# Patient Record
Sex: FEMALE | Race: WHITE | NOT HISPANIC OR LATINO | Employment: FULL TIME | URBAN - METROPOLITAN AREA
[De-identification: names, ages, dates, MRNs, and addresses within clinical notes are randomized per-mention and may not be internally consistent; named-entity substitution may affect disease eponyms.]

---

## 2023-10-09 ENCOUNTER — ANCILLARY PROCEDURE (OUTPATIENT)
Dept: RADIOLOGY | Facility: CLINIC | Age: 58
End: 2023-10-09
Payer: COMMERCIAL

## 2023-10-09 DIAGNOSIS — Z12.31 ENCOUNTER FOR SCREENING MAMMOGRAM FOR MALIGNANT NEOPLASM OF BREAST: ICD-10-CM

## 2023-10-09 PROCEDURE — 77063 BREAST TOMOSYNTHESIS BI: CPT | Mod: 50

## 2023-10-09 PROCEDURE — 77067 SCR MAMMO BI INCL CAD: CPT | Mod: BILATERAL PROCEDURE | Performed by: RADIOLOGY

## 2023-10-09 PROCEDURE — 77063 BREAST TOMOSYNTHESIS BI: CPT | Mod: BILATERAL PROCEDURE | Performed by: RADIOLOGY

## 2023-10-18 ENCOUNTER — TELEPHONE (OUTPATIENT)
Dept: OBSTETRICS AND GYNECOLOGY | Facility: CLINIC | Age: 58
End: 2023-10-18
Payer: COMMERCIAL

## 2023-10-18 DIAGNOSIS — R23.2 HOT FLASHES: Primary | ICD-10-CM

## 2023-10-18 NOTE — TELEPHONE ENCOUNTER
Patient states she was to call if she wanted the dosage to be increased; paroxetine 10 mg currently.

## 2023-10-19 RX ORDER — PAROXETINE HYDROCHLORIDE 20 MG/1
20 TABLET, FILM COATED ORAL EVERY MORNING
Qty: 30 TABLET | Refills: 11 | Status: SHIPPED | OUTPATIENT
Start: 2023-10-19 | End: 2023-10-26 | Stop reason: ALTCHOICE

## 2023-10-26 DIAGNOSIS — N95.1 PERIMENOPAUSAL VASOMOTOR SYMPTOMS: ICD-10-CM

## 2023-10-26 RX ORDER — PAROXETINE 10 MG/1
10 TABLET, FILM COATED ORAL DAILY
Qty: 30 TABLET | Refills: 11 | Status: SHIPPED | OUTPATIENT
Start: 2023-10-26 | End: 2024-10-25

## 2023-10-26 RX ORDER — PAROXETINE 10 MG/1
10 TABLET, FILM COATED ORAL DAILY
COMMUNITY
Start: 2023-09-25 | End: 2023-10-26 | Stop reason: ALTCHOICE

## 2023-12-04 ENCOUNTER — OFFICE VISIT (OUTPATIENT)
Dept: OBSTETRICS AND GYNECOLOGY | Facility: CLINIC | Age: 58
End: 2023-12-04
Payer: COMMERCIAL

## 2023-12-04 VITALS — DIASTOLIC BLOOD PRESSURE: 70 MMHG | SYSTOLIC BLOOD PRESSURE: 120 MMHG

## 2023-12-04 DIAGNOSIS — R23.2 HOT FLASH NOT DUE TO MENOPAUSE: Primary | ICD-10-CM

## 2023-12-04 PROCEDURE — 99212 OFFICE O/P EST SF 10 MIN: CPT | Performed by: NURSE PRACTITIONER

## 2023-12-04 PROCEDURE — 1036F TOBACCO NON-USER: CPT | Performed by: NURSE PRACTITIONER

## 2023-12-04 RX ORDER — ESTRADIOL 0.1 MG/G
CREAM VAGINAL 3 TIMES WEEKLY
COMMUNITY
End: 2024-02-09

## 2023-12-04 NOTE — PROGRESS NOTES
Subjective   Patient ID: Nichelle Padilla is a 57 y.o. female who presents for Follow-up (Paroxetine/Reviewing  EMR/Last Annual Exam: 08/07/2023/Negative Pap / Negative HPV 2022/Negative Screening Mammogram  10/05/2022/).    HPIHere for follow-up after starting paroxetine. She feels that her hot flashes are much improved. She feels this has improved her QOL. She is noticing a sleight improvement in mood as well.  She is having some weight gain and some sexual dysfunction since starting this pill, so she has continued on with the 10 mg instead of increasing to the 20mg as previously discussed in a phone note.     Review of Systems   All other systems reviewed and are negative.      Objective   Physical Exam  Constitutional:       Appearance: Normal appearance.   Pulmonary:      Effort: Pulmonary effort is normal.   Neurological:      Mental Status: She is alert.   Psychiatric:         Mood and Affect: Mood normal.         Behavior: Behavior normal.         Assessment/Plan   Diagnoses and all orders for this visit:  Hot flash not due to menopause  Continue paroxetine 10 mg every day.      Bren Bustillo, APRN-CNP

## 2023-12-06 ENCOUNTER — TELEPHONE (OUTPATIENT)
Dept: PRIMARY CARE | Facility: CLINIC | Age: 58
End: 2023-12-06
Payer: COMMERCIAL

## 2023-12-06 NOTE — TELEPHONE ENCOUNTER
Patient called stating her GYN recommended she reach out to you to ask for a low dose CT lung scan due to smoke exposure from her mother growing up. Mother passed of lung cancer.   Also she is requesting an order for a colonoscopy

## 2023-12-06 NOTE — TELEPHONE ENCOUNTER
Per chart review had colonoscopy in 2017 and was told it next needed in 10 years which would be 2027.  She is having concerns and believes she needs 1 earlier she can schedule an appointment and we can discuss  In terms of lung cancer screening test she would not qualify without a personal history of smoking.  Again happy to have her schedule appointment and we can further discuss

## 2023-12-13 ENCOUNTER — TELEMEDICINE (OUTPATIENT)
Dept: PRIMARY CARE | Facility: CLINIC | Age: 58
End: 2023-12-13
Payer: COMMERCIAL

## 2023-12-13 DIAGNOSIS — M25.562 ACUTE PAIN OF LEFT KNEE: Primary | ICD-10-CM

## 2023-12-13 DIAGNOSIS — R19.4 CHANGE IN BOWEL HABIT: ICD-10-CM

## 2023-12-13 PROCEDURE — 99214 OFFICE O/P EST MOD 30 MIN: CPT | Performed by: FAMILY MEDICINE

## 2023-12-13 NOTE — PROGRESS NOTES
Subjective   Patient ID: Nichelle Padilla is a 57 y.o. female who presents for Knee pain wondering about colonoscopy and lung cancer screen.    HPI   Patient would like to make sure that she does not need to have certain screening tests and also discuss concern of left knee pain    She states she had her last colonoscopy in  and believes she was told 5 or 10 years  She called Dr. Mohr who did her previous colonoscopy last year and they said 10 years  For many years she has had alternating loose stools and she just wants to make sure this is not a concern  No blood or mucus in her stool    She also is wondering if she needs a lung cancer screening test  She has never been a smoker but had secondhand exposure as her mother was a smoker and  of lung cancer  She is asymptomatic    C/o Left knee cap lump very tender to kneel on it in the last few weeks.  She noticed it in her exercise classes  No history of similar  Range of motion is okay  Walking is okay  No meds tried    Review of Systems  10 point review of systems negative except what is noted in HPI  Objective   There were no vitals taken for this visit.    Physical Exam  Constitutional:       Appearance: Normal appearance.   Pulmonary:      Effort: Pulmonary effort is normal.   Neurological:      Mental Status: She is alert.   Psychiatric:         Mood and Affect: Mood normal.         Behavior: Behavior normal.         Thought Content: Thought content normal.         Judgment: Judgment normal.         Assessment/Plan   1. Acute pain of left knee  Discussed with patient sounds like prepatellar bursitis that is relatively mild  Discussed supportive care and NSAIDs.  If symptoms worsen she should come into the office for full evaluation    2. Change in bowel habit  She will touch base with Dr. Barrios as she is established with him to determine if colonoscopy needed at this point    We discussed that she does not qualify for lung cancer screening test due to no  personal history of tobacco use.  We did review her CT cardiac score from 2022 that showed some benign nodules and this made her feel better      Gerson Can, DO

## 2024-02-09 DIAGNOSIS — N95.1 VASOMOTOR SYMPTOMS DUE TO MENOPAUSE: ICD-10-CM

## 2024-02-09 RX ORDER — ESTRADIOL 0.1 MG/G
CREAM VAGINAL
Qty: 42.5 G | Refills: 2 | Status: SHIPPED | OUTPATIENT
Start: 2024-02-09

## 2024-02-09 NOTE — TELEPHONE ENCOUNTER
Reviewing  EMR  Last Annual Exam: 08/07/2023  Negative Pap / Negative HPV 2022  Negative Screening Mammogram  10/09/2023  Annual is scheduled 08/12/2024  Medication pended for Bren Bustillo CNP

## 2024-07-01 ENCOUNTER — OFFICE VISIT (OUTPATIENT)
Dept: PRIMARY CARE | Facility: CLINIC | Age: 59
End: 2024-07-01
Payer: COMMERCIAL

## 2024-07-01 VITALS
WEIGHT: 183.2 LBS | DIASTOLIC BLOOD PRESSURE: 80 MMHG | SYSTOLIC BLOOD PRESSURE: 140 MMHG | BODY MASS INDEX: 29.13 KG/M2 | TEMPERATURE: 98 F

## 2024-07-01 DIAGNOSIS — J02.9 PHARYNGITIS, UNSPECIFIED ETIOLOGY: Primary | ICD-10-CM

## 2024-07-01 PROCEDURE — 99213 OFFICE O/P EST LOW 20 MIN: CPT | Performed by: FAMILY MEDICINE

## 2024-07-01 PROCEDURE — 1036F TOBACCO NON-USER: CPT | Performed by: FAMILY MEDICINE

## 2024-07-01 RX ORDER — AZITHROMYCIN 250 MG/1
TABLET, FILM COATED ORAL DAILY
Qty: 6 TABLET | Refills: 0 | Status: SHIPPED | OUTPATIENT
Start: 2024-07-01 | End: 2024-07-03 | Stop reason: ALTCHOICE

## 2024-07-01 ASSESSMENT — PATIENT HEALTH QUESTIONNAIRE - PHQ9
SUM OF ALL RESPONSES TO PHQ9 QUESTIONS 1 AND 2: 0
1. LITTLE INTEREST OR PLEASURE IN DOING THINGS: NOT AT ALL
2. FEELING DOWN, DEPRESSED OR HOPELESS: NOT AT ALL

## 2024-07-01 ASSESSMENT — ENCOUNTER SYMPTOMS
SINUS PRESSURE: 0
DIARRHEA: 0
VOMITING: 0
FATIGUE: 1
PALPITATIONS: 0
UNEXPECTED WEIGHT CHANGE: 0
COUGH: 0
FEVER: 0
NAUSEA: 0
SORE THROAT: 1

## 2024-07-01 NOTE — PROGRESS NOTES
Subjective   Patient ID: Nichelle Padilla is a 58 y.o. female who presents for Sore Throat and Earache (Right ear).    HPI   2 weeks ST- bilat at first but now just right   [place] like things were getting better but then worsened again  Runny nose  Fatigue   Has used prn nsaids and decongestants -helped mildly  Review of Systems   Constitutional:  Positive for fatigue. Negative for fever and unexpected weight change.   HENT:  Positive for ear pain (right) and sore throat. Negative for postnasal drip and sinus pressure.    Respiratory:  Negative for cough.    Cardiovascular:  Negative for chest pain, palpitations and leg swelling.   Gastrointestinal:  Negative for diarrhea, nausea and vomiting.       Objective   /80   Temp 36.7 °C (98 °F)   Wt 83.1 kg (183 lb 3.2 oz)   BMI 29.13 kg/m²     Physical Exam  Vitals and nursing note reviewed.   Constitutional:       Appearance: Normal appearance. She is normal weight.   HENT:      Head: Normocephalic and atraumatic.      Right Ear: Tympanic membrane, ear canal and external ear normal.      Left Ear: Tympanic membrane, ear canal and external ear normal.      Nose: Congestion and rhinorrhea present.      Mouth/Throat:      Lips: Pink.      Mouth: Mucous membranes are moist.      Tongue: No lesions.      Pharynx: Uvula midline. Posterior oropharyngeal erythema (Mild) present. No pharyngeal swelling, oropharyngeal exudate or uvula swelling.      Tonsils: No tonsillar exudate or tonsillar abscesses.   Eyes:      Conjunctiva/sclera: Conjunctivae normal.   Cardiovascular:      Rate and Rhythm: Normal rate and regular rhythm.      Heart sounds: Normal heart sounds.   Pulmonary:      Effort: Pulmonary effort is normal.      Breath sounds: Normal breath sounds.   Musculoskeletal:         General: No swelling.      Cervical back: Normal range of motion and neck supple.   Lymphadenopathy:      Cervical: Cervical adenopathy (Right-sided mild shotty) present.   Skin:     General:  Skin is warm and dry.      Capillary Refill: Capillary refill takes less than 2 seconds.   Neurological:      General: No focal deficit present.      Mental Status: She is alert. Mental status is at baseline.   Psychiatric:         Mood and Affect: Mood normal.         Behavior: Behavior normal.         Thought Content: Thought content normal.         Judgment: Judgment normal.         Assessment/Plan   Discussed with patient may still be viral.  Supportive care if no better in the next 48 hours antibiotic given  Gerson Can, DO

## 2024-07-03 ENCOUNTER — APPOINTMENT (OUTPATIENT)
Dept: DERMATOLOGY | Facility: CLINIC | Age: 59
End: 2024-07-03
Payer: COMMERCIAL

## 2024-07-03 DIAGNOSIS — L82.1 SEBORRHEIC KERATOSIS: ICD-10-CM

## 2024-07-03 DIAGNOSIS — D18.01 HEMANGIOMA OF SKIN: ICD-10-CM

## 2024-07-03 DIAGNOSIS — D22.9 MELANOCYTIC NEVUS, UNSPECIFIED LOCATION: ICD-10-CM

## 2024-07-03 DIAGNOSIS — Z12.83 ENCOUNTER FOR SCREENING FOR MALIGNANT NEOPLASM OF SKIN: Primary | ICD-10-CM

## 2024-07-03 DIAGNOSIS — L81.4 LENTIGO: ICD-10-CM

## 2024-07-03 PROCEDURE — 99213 OFFICE O/P EST LOW 20 MIN: CPT | Performed by: NURSE PRACTITIONER

## 2024-07-03 PROCEDURE — 1036F TOBACCO NON-USER: CPT | Performed by: NURSE PRACTITIONER

## 2024-07-03 ASSESSMENT — DERMATOLOGY QUALITY OF LIFE (QOL) ASSESSMENT
DATE THE QUALITY-OF-LIFE ASSESSMENT WAS COMPLETED: 60631
RATE HOW BOTHERED YOU ARE BY SYMPTOMS OF YOUR SKIN PROBLEM (EG, ITCHING, STINGING BURNING, HURTING OR SKIN IRRITATION): 1
WHAT SINGLE SKIN CONDITION LISTED BELOW IS THE PATIENT ANSWERING THE QUALITY-OF-LIFE ASSESSMENT QUESTIONS ABOUT: NONE OF THE ABOVE
WHAT SINGLE SKIN CONDITION LISTED BELOW IS THE PATIENT ANSWERING THE QUALITY-OF-LIFE ASSESSMENT QUESTIONS ABOUT: NONE OF THE ABOVE
RATE HOW EMOTIONALLY BOTHERED YOU ARE BY YOUR SKIN PROBLEM (FOR EXAMPLE, WORRY, EMBARRASSMENT, FRUSTRATION): 0 - NEVER BOTHERED
RATE HOW EMOTIONALLY BOTHERED YOU ARE BY YOUR SKIN PROBLEM (FOR EXAMPLE, WORRY, EMBARRASSMENT, FRUSTRATION): 0 - NEVER BOTHERED
RATE HOW BOTHERED YOU ARE BY EFFECTS OF YOUR SKIN PROBLEMS ON YOUR ACTIVITIES (EG, GOING OUT, ACCOMPLISHING WHAT YOU WANT, WORK ACTIVITIES OR YOUR RELATIONSHIPS WITH OTHERS): 1
RATE HOW BOTHERED YOU ARE BY EFFECTS OF YOUR SKIN PROBLEMS ON YOUR ACTIVITIES (EG, GOING OUT, ACCOMPLISHING WHAT YOU WANT, WORK ACTIVITIES OR YOUR RELATIONSHIPS WITH OTHERS): 1
RATE HOW BOTHERED YOU ARE BY SYMPTOMS OF YOUR SKIN PROBLEM (EG, ITCHING, STINGING BURNING, HURTING OR SKIN IRRITATION): 1

## 2024-07-03 NOTE — PROGRESS NOTES
Subjective   Nichelle Padilla is a 58 y.o. female who presents for the following: Skin Check.  Skin Cancer Screening  She has a history of moderate sun exposure. She is in the sun continuously. She uses sunscreen occasionally. She reports no skin symptoms. Her moles are not changing.    Spots that concern her:         Objective   Well appearing patient in no apparent distress; mood and affect are within normal limits.    A full examination was performed including scalp, head, eyes, ears, nose, lips, neck, chest, axillae, abdomen, back, buttocks, bilateral upper extremities, bilateral lower extremities, hands, feet, fingers, toes, fingernails, and toenails. All findings within normal limits unless otherwise noted below.    Scattered benign lesions    Uniform pigmented macule(s)/papule(s) with reassuring findings on dermoscopy    Stuck on verrucous, tan-brown papules and plaques.      Violaceous/red papule with maroon lagoons     Scattered tan macules in sun-exposed areas.      Assessment/Plan   Encounter for screening for malignant neoplasm of skin    - Protective measures, such as avoiding skin exposure to sunlight during peak sun hours (10 AM to 3 PM), wearing protective clothing, and applying high-SPF sunscreen, are essential for reducing exposure to harmful ultraviolet (UV) light.  - Monthly self-examination of the skin is helpful to detect new lesions or changes in existing lesions.  - Discussed signs and symptoms of sun-related skin cancers.   - Make sure your moles are not signs of skin cancer (melanoma). Remember the ABCDEs of melanoma lesions:  A - Asymmetry: One half of the lesion does not mirror the other half.  B - Border: The borders are irregular or vague (indistinct).  C - Color: More than one color may be noted within the mole.  D - Diameter: Size greater than 6 mm (roughly the size of a pencil eraser) may be concerning.  E - Evolving: Notable changes in the lesion over time are suspicious signs for  skin cancer.    Melanocytic nevus, unspecified location    -Discussed nature of condition  -Reassurance, benign-appearing features on examination today  -Recommend continued observation    Seborrheic keratosis    Although Seborrheic Keratoses can be troublesome and unsightly, they are entirely benign.  Removal of Seborrheic Keratoses is considered a cosmetic procedure. Removal is typically performed using liquid nitrogen cryotherapy.  Treatment of current lesions does not prevent the development of new Seborrheic Keratoses in the future.    Hemangioma of skin    - A cherry hemangioma is a small macule (small, flat, smooth area) or papule (small, solid bump) formed from an overgrowth of tiny blood vessels in the skin. Cherry hemangiomas are characteristically red or purplish in color. They often first appear in middle adulthood and usually increase in number with age. Cherry hemangiomas are noncancerous (benign) and are common in adults.  - Lesions are benign, reassured patient.     Lentigo    A solar lentigo (plural, solar lentigines), sometimes called an age spot or liver spot, is a brown macule (small, flat, smooth area of skin) caused by chronic sun or artificial ultraviolet (UV) light exposure. There may be just one lentigo or there may be multiple. This type of lentigo is different from lentigo simplex (discussed separately) because it is caused by exposure to UV light. Solar lentigines are benign, but they do indicate excessive sun exposure, a risk factor for the development of skin cancer.  Lesions are benign, no treatment needed.     Follow up in 12 months for a total body skin exam. Please call me if there are any changes or development of concerning symptoms (lesion/skin condition is changing, bleeding, enlarging, or worsening).

## 2024-08-12 ENCOUNTER — APPOINTMENT (OUTPATIENT)
Dept: OBSTETRICS AND GYNECOLOGY | Facility: CLINIC | Age: 59
End: 2024-08-12
Payer: COMMERCIAL

## 2024-10-07 ENCOUNTER — LAB (OUTPATIENT)
Dept: LAB | Facility: LAB | Age: 59
End: 2024-10-07
Payer: COMMERCIAL

## 2024-10-07 ENCOUNTER — APPOINTMENT (OUTPATIENT)
Dept: PRIMARY CARE | Facility: CLINIC | Age: 59
End: 2024-10-07
Payer: COMMERCIAL

## 2024-10-07 VITALS
OXYGEN SATURATION: 98 % | TEMPERATURE: 97.7 F | BODY MASS INDEX: 28.82 KG/M2 | DIASTOLIC BLOOD PRESSURE: 90 MMHG | SYSTOLIC BLOOD PRESSURE: 140 MMHG | WEIGHT: 183.6 LBS | HEIGHT: 67 IN | HEART RATE: 73 BPM

## 2024-10-07 DIAGNOSIS — R00.2 PALPITATIONS: ICD-10-CM

## 2024-10-07 DIAGNOSIS — Z00.00 ROUTINE GENERAL MEDICAL EXAMINATION AT A HEALTH CARE FACILITY: Primary | ICD-10-CM

## 2024-10-07 DIAGNOSIS — M25.511 CHRONIC PAIN OF BOTH SHOULDERS: ICD-10-CM

## 2024-10-07 DIAGNOSIS — R73.01 ELEVATED FASTING GLUCOSE: ICD-10-CM

## 2024-10-07 DIAGNOSIS — R20.2 PARESTHESIA AND PAIN OF BOTH UPPER EXTREMITIES: ICD-10-CM

## 2024-10-07 DIAGNOSIS — M79.602 PARESTHESIA AND PAIN OF BOTH UPPER EXTREMITIES: ICD-10-CM

## 2024-10-07 DIAGNOSIS — Z00.00 ROUTINE GENERAL MEDICAL EXAMINATION AT A HEALTH CARE FACILITY: ICD-10-CM

## 2024-10-07 DIAGNOSIS — M79.601 PARESTHESIA AND PAIN OF BOTH UPPER EXTREMITIES: ICD-10-CM

## 2024-10-07 DIAGNOSIS — M25.512 CHRONIC PAIN OF BOTH SHOULDERS: ICD-10-CM

## 2024-10-07 DIAGNOSIS — G89.29 CHRONIC PAIN OF BOTH SHOULDERS: ICD-10-CM

## 2024-10-07 PROBLEM — M25.519 SHOULDER PAIN: Status: ACTIVE | Noted: 2024-10-07

## 2024-10-07 PROBLEM — E78.5 HYPERLIPEMIA: Status: ACTIVE | Noted: 2024-10-07

## 2024-10-07 PROBLEM — I82.409 ACUTE EMBOLISM AND THROMBOSIS OF UNSPECIFIED DEEP VEINS OF UNSPECIFIED LOWER EXTREMITY (MULTI): Status: ACTIVE | Noted: 2024-10-07

## 2024-10-07 LAB
ALBUMIN SERPL BCP-MCNC: 4.2 G/DL (ref 3.4–5)
ALP SERPL-CCNC: 70 U/L (ref 33–110)
ALT SERPL W P-5'-P-CCNC: 15 U/L (ref 7–45)
ANION GAP SERPL CALC-SCNC: 11 MMOL/L (ref 10–20)
AST SERPL W P-5'-P-CCNC: 18 U/L (ref 9–39)
BASOPHILS # BLD AUTO: 0.05 X10*3/UL (ref 0–0.1)
BASOPHILS NFR BLD AUTO: 0.8 %
BILIRUB SERPL-MCNC: 1 MG/DL (ref 0–1.2)
BUN SERPL-MCNC: 12 MG/DL (ref 6–23)
CALCIUM SERPL-MCNC: 9 MG/DL (ref 8.6–10.3)
CHLORIDE SERPL-SCNC: 104 MMOL/L (ref 98–107)
CHOLEST SERPL-MCNC: 259 MG/DL (ref 0–199)
CHOLESTEROL/HDL RATIO: 3.8
CO2 SERPL-SCNC: 28 MMOL/L (ref 21–32)
CREAT SERPL-MCNC: 0.8 MG/DL (ref 0.5–1.05)
EGFRCR SERPLBLD CKD-EPI 2021: 86 ML/MIN/1.73M*2
EOSINOPHIL # BLD AUTO: 0.23 X10*3/UL (ref 0–0.7)
EOSINOPHIL NFR BLD AUTO: 3.5 %
ERYTHROCYTE [DISTWIDTH] IN BLOOD BY AUTOMATED COUNT: 12.9 % (ref 11.5–14.5)
GLUCOSE SERPL-MCNC: 117 MG/DL (ref 74–99)
HCT VFR BLD AUTO: 44 % (ref 36–46)
HDLC SERPL-MCNC: 67.8 MG/DL
HGB BLD-MCNC: 14.5 G/DL (ref 12–16)
IMM GRANULOCYTES # BLD AUTO: 0.01 X10*3/UL (ref 0–0.7)
IMM GRANULOCYTES NFR BLD AUTO: 0.2 % (ref 0–0.9)
LDLC SERPL CALC-MCNC: 173 MG/DL
LYMPHOCYTES # BLD AUTO: 1.34 X10*3/UL (ref 1.2–4.8)
LYMPHOCYTES NFR BLD AUTO: 20.2 %
MCH RBC QN AUTO: 29.7 PG (ref 26–34)
MCHC RBC AUTO-ENTMCNC: 33 G/DL (ref 32–36)
MCV RBC AUTO: 90 FL (ref 80–100)
MONOCYTES # BLD AUTO: 0.76 X10*3/UL (ref 0.1–1)
MONOCYTES NFR BLD AUTO: 11.4 %
NEUTROPHILS # BLD AUTO: 4.25 X10*3/UL (ref 1.2–7.7)
NEUTROPHILS NFR BLD AUTO: 63.9 %
NON HDL CHOLESTEROL: 191 MG/DL (ref 0–149)
NRBC BLD-RTO: 0 /100 WBCS (ref 0–0)
PLATELET # BLD AUTO: 216 X10*3/UL (ref 150–450)
POTASSIUM SERPL-SCNC: 4.1 MMOL/L (ref 3.5–5.3)
PROT SERPL-MCNC: 6.7 G/DL (ref 6.4–8.2)
RBC # BLD AUTO: 4.89 X10*6/UL (ref 4–5.2)
SODIUM SERPL-SCNC: 139 MMOL/L (ref 136–145)
TRIGL SERPL-MCNC: 89 MG/DL (ref 0–149)
TSH SERPL-ACNC: 1.72 MIU/L (ref 0.44–3.98)
VLDL: 18 MG/DL (ref 0–40)
WBC # BLD AUTO: 6.6 X10*3/UL (ref 4.4–11.3)

## 2024-10-07 PROCEDURE — 85025 COMPLETE CBC W/AUTO DIFF WBC: CPT

## 2024-10-07 PROCEDURE — 99396 PREV VISIT EST AGE 40-64: CPT | Performed by: FAMILY MEDICINE

## 2024-10-07 PROCEDURE — 1036F TOBACCO NON-USER: CPT | Performed by: FAMILY MEDICINE

## 2024-10-07 PROCEDURE — 83036 HEMOGLOBIN GLYCOSYLATED A1C: CPT

## 2024-10-07 PROCEDURE — 36415 COLL VENOUS BLD VENIPUNCTURE: CPT

## 2024-10-07 PROCEDURE — 80053 COMPREHEN METABOLIC PANEL: CPT

## 2024-10-07 PROCEDURE — 80061 LIPID PANEL: CPT

## 2024-10-07 PROCEDURE — 99214 OFFICE O/P EST MOD 30 MIN: CPT | Performed by: FAMILY MEDICINE

## 2024-10-07 PROCEDURE — 3008F BODY MASS INDEX DOCD: CPT | Performed by: FAMILY MEDICINE

## 2024-10-07 PROCEDURE — 84443 ASSAY THYROID STIM HORMONE: CPT

## 2024-10-07 RX ORDER — MELOXICAM 15 MG/1
15 TABLET ORAL DAILY
Qty: 30 TABLET | Refills: 1 | Status: SHIPPED | OUTPATIENT
Start: 2024-10-07 | End: 2025-10-07

## 2024-10-07 ASSESSMENT — ENCOUNTER SYMPTOMS
DYSURIA: 0
HEADACHES: 0
WOUND: 0
APPETITE CHANGE: 0
SHORTNESS OF BREATH: 0
VOMITING: 0
SINUS PRESSURE: 0
COUGH: 0
NAUSEA: 0
ABDOMINAL PAIN: 0
CONSTIPATION: 0
NECK PAIN: 1
NUMBNESS: 1
NERVOUS/ANXIOUS: 0
LIGHT-HEADEDNESS: 0
DYSPHORIC MOOD: 0
BLOOD IN STOOL: 0
DIARRHEA: 0
DIZZINESS: 0
FATIGUE: 0
JOINT SWELLING: 0
ACTIVITY CHANGE: 0
SLEEP DISTURBANCE: 0
ARTHRALGIAS: 0
EYE PAIN: 0
PALPITATIONS: 0

## 2024-10-07 ASSESSMENT — PATIENT HEALTH QUESTIONNAIRE - PHQ9
2. FEELING DOWN, DEPRESSED OR HOPELESS: NOT AT ALL
1. LITTLE INTEREST OR PLEASURE IN DOING THINGS: NOT AT ALL
SUM OF ALL RESPONSES TO PHQ9 QUESTIONS 1 AND 2: 0

## 2024-10-07 NOTE — PROGRESS NOTES
Subjective   Patient ID: Nichelle Padilla is a 58 y.o. female who presents for Annual Exam + concerns- Pt agrees to both    HPI   Derm- Susan Mayne  GYN- Bren Bustillo-appointment next week-Pap and mammogram up-to-date-mammogram due soon  GI- Dr Burrell -colonscopy done 11/17/17- normal- next due 2027    Shoulder issues b/l pain- has done PT many years ago   Gets some numbness down her arms   Get massage and told thought thoracic outlet syndrome so went to see vascular which was ruled out by CXR and US UE. Told likely neurologic and to get that checked out instead   Still with numbness and pain in right hand and thumb more so recently   Had XR cervical xrays in 2016 for similar sx   Wears a brace at night which helped previously but not so much now   She is right handed    She also notes she occasionally has palpitations.  She states they occur for a couple seconds at most weekly.  There is no associated pain or shortness of breath    She does have some frustration with difficult weight loss  Exercise 3 days a week -next of cycling hiking walking etc.  Enjoys sweets   Post menopausal     Is considering shingles vaccine but declines flu vaccine    Review of Systems   Constitutional:  Negative for activity change, appetite change and fatigue.   HENT:  Negative for congestion, postnasal drip and sinus pressure.    Eyes:  Negative for pain and visual disturbance.   Respiratory:  Negative for cough and shortness of breath.    Cardiovascular:  Negative for chest pain, palpitations and leg swelling.   Gastrointestinal:  Negative for abdominal pain, blood in stool, constipation, diarrhea, nausea and vomiting.   Endocrine: Negative for cold intolerance and heat intolerance.   Genitourinary:  Negative for dysuria and menstrual problem.   Musculoskeletal:  Positive for neck pain. Negative for arthralgias and joint swelling.   Skin:  Negative for rash and wound.   Neurological:  Positive for numbness (hands R>L). Negative for  "dizziness, light-headedness and headaches.   Psychiatric/Behavioral:  Negative for dysphoric mood and sleep disturbance. The patient is not nervous/anxious.        Objective   /90   Pulse 73   Temp 36.5 °C (97.7 °F)   Ht 1.689 m (5' 6.5\")   Wt 83.3 kg (183 lb 9.6 oz)   SpO2 98%   BMI 29.19 kg/m²     Physical Exam  Vitals and nursing note reviewed.   Constitutional:       Appearance: Normal appearance.   HENT:      Head: Normocephalic and atraumatic.      Right Ear: Tympanic membrane, ear canal and external ear normal.      Left Ear: Tympanic membrane, ear canal and external ear normal.      Nose: Nose normal.      Mouth/Throat:      Mouth: Mucous membranes are moist.      Pharynx: Oropharynx is clear.   Eyes:      Extraocular Movements: Extraocular movements intact.      Conjunctiva/sclera: Conjunctivae normal.      Pupils: Pupils are equal, round, and reactive to light.   Neck:      Comments: Spurling test negative bilaterally  Cardiovascular:      Rate and Rhythm: Normal rate and regular rhythm.      Pulses: Normal pulses.      Heart sounds: Normal heart sounds. No murmur heard.     No friction rub. No gallop.   Pulmonary:      Effort: Pulmonary effort is normal. No respiratory distress.      Breath sounds: Normal breath sounds.   Abdominal:      General: Abdomen is flat. Bowel sounds are normal.      Palpations: Abdomen is soft. There is no mass.      Tenderness: There is no abdominal tenderness. There is no rebound.   Musculoskeletal:         General: No swelling or deformity. Normal range of motion.      Right shoulder: Normal. No tenderness or bony tenderness. Normal range of motion.      Left shoulder: No tenderness or bony tenderness. Normal range of motion.      Right wrist: Normal.      Left wrist: Normal.      Right hand: Normal.      Left hand: Normal.      Cervical back: Normal, normal range of motion and neck supple. No rigidity, spasms, torticollis, tenderness or bony tenderness. No pain " with movement, spinous process tenderness or muscular tenderness. Normal range of motion.      Comments: Tinel's and Finkelstein's negative   Lymphadenopathy:      Cervical: No cervical adenopathy.   Skin:     General: Skin is warm and dry.      Capillary Refill: Capillary refill takes less than 2 seconds.      Findings: No rash.   Neurological:      General: No focal deficit present.      Mental Status: She is alert and oriented to person, place, and time. Mental status is at baseline.      Cranial Nerves: No cranial nerve deficit.      Sensory: Sensation is intact.      Motor: Motor function is intact. No weakness, tremor, atrophy, abnormal muscle tone or pronator drift.      Coordination: Coordination is intact.      Gait: Gait normal.      Deep Tendon Reflexes: Reflexes normal.      Reflex Scores:       Tricep reflexes are 2+ on the right side and 2+ on the left side.       Bicep reflexes are 2+ on the right side and 2+ on the left side.       Brachioradialis reflexes are 2+ on the right side and 1+ on the left side.  Psychiatric:         Mood and Affect: Mood normal.         Behavior: Behavior normal.         Thought Content: Thought content normal.         Judgment: Judgment normal.         Assessment/Plan   1. Routine general medical examination at a health care facility  Age-appropriate anticipatory guidance given  Consider shingles and flu vaccines  Mammogram and Pap by GYN  Colonoscopy due 2027  - CBC and Auto Differential; Future  - Lipid Panel; Future  - Comprehensive Metabolic Panel; Future  - TSH with reflex to Free T4 if abnormal; Future    2. Paresthesia and pain of both upper extremities  3. Chronic pain of both shoulders  Discussed with patient may be multiple factors at play-we will get EMG/NCT to discuss next steps moving forward  She states she did do physical therapy in the past with traction which was helpful for her shoulder pain but she was not having the paresthesias in her hands at the  same time  Likely component of carpal tunnel as well  She is already doing bracing  Trial of meloxicam in the meantime.  Follow-up to be determined  - EMG & nerve conduction; Future  - meloxicam (Mobic) 15 mg tablet; Take 1 tablet (15 mg) by mouth once daily.  Dispense: 30 tablet; Refill: 1        4. Palpitations  Suggest likely benign palpitations.  We will get TSH.  Can consider further workup based on patient's home monitoring  Mildly elevated BP. Cont to monitor        Gerson Can, DO

## 2024-10-07 NOTE — PATIENT INSTRUCTIONS
Take mobic daily for 7-10 days then use as needed if helping     Get emg/NCT done  done     Recommendations for women annual wellness exam:  Make sure screenings for cervical, breast, and colon cancer are up to date if applicable- pap smears age 21-65, discuss mammogram starting at age 40, colonoscopy at age 45, earlier if positive family history for breast or colon cancer  Screen for osteoporosis with DXA bone scan starting at age 65 or sooner if risk factors present (long term steroid use, smoking, heavy alcohol use, history of fracture, rheumatoid arthritis, low body weight, family history of hip fracture)  Screening for lung cancer with low-dose CT in all adults age 55-77 who have a 30 pack-year smoking history and currently smoke or have quit within the past 15 years  Follow a healthy diet (Dash diet, Mediterranean diet)  Exercise 150 min/wk  Maintain healthy weight (BMI < 25)  Do not smoke  Alcohol in moderation (up to 1 drink/day)  Get enough sleep (7-8 hours/night)  Make sure immunizations are up to date (influenza, pneumococcal, Tdap, shingles if age > 50)  Postmenopausal women or women with osteoporosis need minimum 1,200 mg calcium and 800 IU vitamin D daily  Talk to your physician if you have concerns about depression or anxiety  Visit dentist twice yearly

## 2024-10-08 ENCOUNTER — TELEPHONE (OUTPATIENT)
Dept: PRIMARY CARE | Facility: CLINIC | Age: 59
End: 2024-10-08
Payer: COMMERCIAL

## 2024-10-08 NOTE — TELEPHONE ENCOUNTER
Patient was prescribed meloxicam yesterday. She's been coughing and having chest congestion and wanted to know if she could take an otc cough medication or tylenol while taking the meloxicam?

## 2024-10-09 DIAGNOSIS — Z00.00 ROUTINE GENERAL MEDICAL EXAMINATION AT A HEALTH CARE FACILITY: ICD-10-CM

## 2024-10-09 DIAGNOSIS — R73.01 ELEVATED FASTING GLUCOSE: Primary | ICD-10-CM

## 2024-10-09 LAB
EST. AVERAGE GLUCOSE BLD GHB EST-MCNC: 117 MG/DL
HBA1C MFR BLD: 5.7 %

## 2024-10-10 ENCOUNTER — E-VISIT (OUTPATIENT)
Dept: PRIMARY CARE | Facility: CLINIC | Age: 59
End: 2024-10-10
Payer: COMMERCIAL

## 2024-10-10 DIAGNOSIS — E78.2 MIXED HYPERLIPIDEMIA: Primary | ICD-10-CM

## 2024-10-10 DIAGNOSIS — R73.03 PREDIABETES: ICD-10-CM

## 2024-10-11 ENCOUNTER — TELEPHONE (OUTPATIENT)
Dept: PRIMARY CARE | Facility: CLINIC | Age: 59
End: 2024-10-11
Payer: COMMERCIAL

## 2024-10-11 DIAGNOSIS — R73.03 PREDIABETES: ICD-10-CM

## 2024-10-11 DIAGNOSIS — E78.5 HYPERLIPIDEMIA, UNSPECIFIED HYPERLIPIDEMIA TYPE: ICD-10-CM

## 2024-10-11 NOTE — TELEPHONE ENCOUNTER
Per roberth smith to place order for nutritionist.   Will call patient to give her info and schedule 6 month follow up

## 2024-10-14 ENCOUNTER — APPOINTMENT (OUTPATIENT)
Dept: OBSTETRICS AND GYNECOLOGY | Facility: CLINIC | Age: 59
End: 2024-10-14
Payer: COMMERCIAL

## 2024-10-14 VITALS — SYSTOLIC BLOOD PRESSURE: 110 MMHG | DIASTOLIC BLOOD PRESSURE: 70 MMHG

## 2024-10-14 DIAGNOSIS — Z01.419 ENCOUNTER FOR WELL WOMAN EXAM WITH ROUTINE GYNECOLOGICAL EXAM: Primary | ICD-10-CM

## 2024-10-14 DIAGNOSIS — N95.1 VASOMOTOR SYMPTOMS DUE TO MENOPAUSE: ICD-10-CM

## 2024-10-14 PROCEDURE — 99396 PREV VISIT EST AGE 40-64: CPT | Performed by: NURSE PRACTITIONER

## 2024-10-14 PROCEDURE — 1036F TOBACCO NON-USER: CPT | Performed by: NURSE PRACTITIONER

## 2024-10-14 RX ORDER — ESTRADIOL 0.1 MG/G
CREAM VAGINAL
Qty: 42.5 G | Refills: 3 | Status: SHIPPED | OUTPATIENT
Start: 2024-10-14

## 2024-10-14 NOTE — PROGRESS NOTES
HPI:   Nichelle Padilla is a 58 y.o. who presents today for her annual gynecologic exam without complaints    She has the following concerns; None. No GYN concerns she is doing well. Needs refills of her vaginal estrace cream. No longer taking paroxetine. Doing well without this medication. Does not want to restart this.    GYN HISTORY:  Denies any PMB.     PAP History   Last pap:   2022 Normal HPV Negative  History of abnormal pap: no  HPV vaccine: no  @paphx@    Health Screening  Family history of breast, uterine, ovarian or colon cancer: maternal aunt and maternal grandmother has a hx of breast cancer.    Breast cancer: mammogram - needs an order.   Last mammogram: 2023    Colon cancer: due in 2027.       The patient feels safe at home.         Review of Systems:   Constitutional: no fever and no chills.  Cardiovascular: no chest pain.   Respiratory: no shortness of breath.   Gastrointestinal: no nausea, no abdominal pain and no constipation  Genitourinary: no dysuria, no urinary incontinence, no vaginal dryness, no pelvic pain and no vaginal discharge.   Neurological: no headache.  Psychiatric: no anxiety and no depression.              Objective         /70         Physical Exam:   Constitutional: Alert and in no acute distress. Well developed, well nourished.      Neck: No neck asymmetry. Supple. Thyroid not enlarged and there were no palpable thyroid nodules.      Cardiovascular: Heart rate and rhythm were normal, normal S1 and S2, no gallops, and no murmurs.      Pulmonary: No respiratory distress. Clear bilateral breath sounds.      Chest: Breasts: Normal appearance, no nipple discharge and no skin changes. Palpation of breasts and axillae: No palpable mass and no axillary lymphadenopathy.      Abdomen: Soft nontender; no abdominal mass palpated. Normal bowel sounds. No organomegaly.      Genitourinary:   - External genitalia: Normal.   - Palpation of lymph nodes in groin: No inguinal  lymphadenopathy.   - Bartholin's Urethral and Skenes Glands: Normal.   - Urethra: Normal.    -Bladder: Normal on palpation.   - Vagina: Normal.   - Cervix: Normal.   - Uterus: Normal. Right Adnexa/parametria: Normal. Left Adnexa/parametria: Normal.   - Perianal Area: Normal.      Skin: Normal skin color and pigmentation, normal skin turgor, and no rash     Psychiatric: Alert and oriented x 3. Affect normal to patient baseline. Mood: Appropriate.            Assessment/Plan       Diagnoses and all orders for this visit:  Encounter for well woman exam with routine gynecological exam  Here for well woman exam. She is doing well. PAP is up to date. Mammogram is scheduled. Vaginal estrace cream refilled.   Vasomotor symptoms due to menopause  -     estradiol (Estrace) 0.01 % (0.1 mg/gram) vaginal cream; Insert one gram into the vagina twice weekly.  Follow-up annually; sooner if needed.       Bren Bustillo, APRN-CNP

## 2024-10-23 ENCOUNTER — HOSPITAL ENCOUNTER (OUTPATIENT)
Dept: RADIOLOGY | Facility: CLINIC | Age: 59
Discharge: HOME | End: 2024-10-23
Payer: COMMERCIAL

## 2024-10-23 VITALS — BODY MASS INDEX: 28.45 KG/M2 | WEIGHT: 177 LBS | HEIGHT: 66 IN

## 2024-10-23 DIAGNOSIS — Z00.00 ROUTINE GENERAL MEDICAL EXAMINATION AT A HEALTH CARE FACILITY: ICD-10-CM

## 2024-10-23 PROCEDURE — 77067 SCR MAMMO BI INCL CAD: CPT

## 2024-10-23 PROCEDURE — 77067 SCR MAMMO BI INCL CAD: CPT | Performed by: RADIOLOGY

## 2024-10-23 PROCEDURE — 77063 BREAST TOMOSYNTHESIS BI: CPT | Performed by: RADIOLOGY

## 2024-11-06 ENCOUNTER — TELEPHONE (OUTPATIENT)
Dept: SCHEDULING | Age: 59
End: 2024-11-06
Payer: COMMERCIAL

## 2024-11-06 NOTE — TELEPHONE ENCOUNTER
I spoke with the pt regarding the scheduling of EMG testing for her arm pain.  She has left [facility] [place], [place] with the dept that handles this testing. I also spoke to her about a nutritionist. She said she will call back, but I also told her I will find out if there is someone that handles menopausal nutrition, and return her call.

## 2024-11-21 ENCOUNTER — TELEPHONE (OUTPATIENT)
Dept: PRIMARY CARE | Facility: CLINIC | Age: 59
End: 2024-11-21
Payer: COMMERCIAL

## 2024-11-21 DIAGNOSIS — G89.29 CHRONIC PAIN OF BOTH SHOULDERS: ICD-10-CM

## 2024-11-21 DIAGNOSIS — R20.2 PARESTHESIA AND PAIN OF BOTH UPPER EXTREMITIES: ICD-10-CM

## 2024-11-21 DIAGNOSIS — M79.602 PARESTHESIA AND PAIN OF BOTH UPPER EXTREMITIES: ICD-10-CM

## 2024-11-21 DIAGNOSIS — M25.511 CHRONIC PAIN OF BOTH SHOULDERS: ICD-10-CM

## 2024-11-21 DIAGNOSIS — M79.601 PARESTHESIA AND PAIN OF BOTH UPPER EXTREMITIES: ICD-10-CM

## 2024-11-21 DIAGNOSIS — M25.512 CHRONIC PAIN OF BOTH SHOULDERS: ICD-10-CM

## 2024-11-21 RX ORDER — MELOXICAM 15 MG/1
15 TABLET ORAL DAILY
Qty: 30 TABLET | Refills: 1 | Status: SHIPPED | OUTPATIENT
Start: 2024-11-21 | End: 2025-11-21

## 2024-11-26 ENCOUNTER — HOSPITAL ENCOUNTER (OUTPATIENT)
Dept: NEUROLOGY | Facility: CLINIC | Age: 59
Discharge: HOME | End: 2024-11-26
Payer: COMMERCIAL

## 2024-11-26 DIAGNOSIS — M79.601 PARESTHESIA AND PAIN OF BOTH UPPER EXTREMITIES: ICD-10-CM

## 2024-11-26 DIAGNOSIS — M25.511 CHRONIC PAIN OF BOTH SHOULDERS: ICD-10-CM

## 2024-11-26 DIAGNOSIS — M25.512 CHRONIC PAIN OF BOTH SHOULDERS: ICD-10-CM

## 2024-11-26 DIAGNOSIS — R20.2 PARESTHESIA AND PAIN OF BOTH UPPER EXTREMITIES: ICD-10-CM

## 2024-11-26 DIAGNOSIS — G89.29 CHRONIC PAIN OF BOTH SHOULDERS: ICD-10-CM

## 2024-11-26 DIAGNOSIS — M79.602 PARESTHESIA AND PAIN OF BOTH UPPER EXTREMITIES: ICD-10-CM

## 2024-11-26 PROCEDURE — 95886 MUSC TEST DONE W/N TEST COMP: CPT | Performed by: STUDENT IN AN ORGANIZED HEALTH CARE EDUCATION/TRAINING PROGRAM

## 2024-11-26 PROCEDURE — 95912 NRV CNDJ TEST 11-12 STUDIES: CPT | Performed by: STUDENT IN AN ORGANIZED HEALTH CARE EDUCATION/TRAINING PROGRAM

## 2024-11-26 PROCEDURE — 95885 MUSC TST DONE W/NERV TST LIM: CPT | Performed by: STUDENT IN AN ORGANIZED HEALTH CARE EDUCATION/TRAINING PROGRAM

## 2024-12-02 ENCOUNTER — TELEPHONE (OUTPATIENT)
Dept: PRIMARY CARE | Facility: CLINIC | Age: 59
End: 2024-12-02
Payer: COMMERCIAL

## 2024-12-02 DIAGNOSIS — G56.03 BILATERAL CARPAL TUNNEL SYNDROME: Primary | ICD-10-CM

## 2024-12-02 NOTE — TELEPHONE ENCOUNTER
----- Message from Gerson Can sent at 12/2/2024  9:30 AM EST -----  Please let patient know EMG nerve conduction test findings consistent with moderate to severe carpal tunnel syndrome.  I would suggest referral to orthopedics to talk about next steps and intervention including injections and/or surgery.  I suggest Dr. José Manuel Chacko with  and have placed a orthopedic hand referral

## 2024-12-02 NOTE — TELEPHONE ENCOUNTER
Okay per HIPAA to leave a detailed message.  Left voicemail relaying message from provider. Also left Fara's number to help with scheduling

## 2024-12-19 ENCOUNTER — APPOINTMENT (OUTPATIENT)
Dept: PRIMARY CARE | Facility: CLINIC | Age: 59
End: 2024-12-19
Payer: COMMERCIAL

## 2025-01-28 ENCOUNTER — OFFICE VISIT (OUTPATIENT)
Dept: ORTHOPEDIC SURGERY | Facility: CLINIC | Age: 60
End: 2025-01-28
Payer: COMMERCIAL

## 2025-01-28 DIAGNOSIS — G56.03 BILATERAL CARPAL TUNNEL SYNDROME: ICD-10-CM

## 2025-01-28 PROCEDURE — 99204 OFFICE O/P NEW MOD 45 MIN: CPT | Performed by: ORTHOPAEDIC SURGERY

## 2025-01-28 PROCEDURE — 1036F TOBACCO NON-USER: CPT | Performed by: ORTHOPAEDIC SURGERY

## 2025-01-28 PROCEDURE — 99214 OFFICE O/P EST MOD 30 MIN: CPT | Performed by: ORTHOPAEDIC SURGERY

## 2025-01-28 NOTE — LETTER
coordinator, who presents today for evaluation of numbness and tingling into the radial digits of both hands and pain at the base of both thumbs.  No particular recalled trauma.  The tingling is worse at night and does occasionally wake her with a positive shake sign.  She is beginning to have symptoms during the day as well.  No noted weakness.  Not dropping objects.  Pain at the base of the thumbs waxes and wanes from day to day, becoming sharper with pinch or grasp.  No popping, clicking, or subjective instability.    She is not diabetic or hypothyroid.  She does not smoke.  She has been taking meloxicam for the symptoms.      REVIEW OF SYSTEMS       A 30-item multi-system Review Of Systems was obtained on today's intake form.  This was reviewed with the patient and is correct.  The pertinent positives and negatives are listed above.  The form has been scanned separately into the medical record.      PHYSICAL EXAM    Constitutional:    Appears stated age. Well-developed and well-nourished female in no acute distress.  Psychiatric:         Pleasant normal mood and affect. Behavior is appropriate for the situation.   Head:                   Normocephalic and atraumatic.  Eyes:                    Pupils are equal and round.  Cardiovascular:  2+ radial and ulnar pulses. Fingers well-perfused.  Respiratory:        Effort normal. No respiratory distress. Speaking in complete sentences.  Neurologic:       Alert and oriented to person, place, and time.  Skin:                Skin is intact, warm and dry.  Hematologic / Lymphatic:    No lymphedema or lymphangitis.    Extremities / Musculoskeletal:                      Skin of both hands and wrists is intact with no erythema, ecchymosis, or diffuse swelling.  Normal skin drag and coloration.  Full composite finger flexion extension with good intrinsic plus minus posture.  5/5 APB and hand intrinsics with no wasting.  Discomfort with palpation at the CMC, but not STT.  No MP  hyperextension collapse.  Good IP flexion extension pull-through with no triggering or de Quervain's signs.  Positive Phalen and Durkan but negative Tinel at both wrists and elbows.  Negative elbow flexion test.  Cervical range of motion is good and does not reproduce chief complaint.  Sensation intact to light touch in all distributions.  Capillary refill less than 2 seconds.      IMAGING / LABS / EMGs           EMG from November 26, 2024 was reviewed and shows slowing consistent with carpal tunnel syndrome, more pronounced on the right.  Early APB changes on the needle component.  Otherwise normal.      Past Medical History:   Diagnosis Date   • Pain in unspecified shoulder     Shoulder pain   • Paresthesia of skin     Paresthesia   • Personal history of other diseases of the female genital tract 10/24/2016    History of vaginitis       Medication Documentation Review Audit       Reviewed by NIKA Rose (Nurse Practitioner) on 10/14/24 at 1410      Medication Order Taking? Sig Documenting Provider Last Dose Status   estradiol (Estrace) 0.01 % (0.1 mg/gram) vaginal cream [id number] Yes INSERT 1 GRAM VAGINALLY AT BEDTIME FOR 14 DAYS FOLLOWED BY TWICE WEEKLY NIKA Rose  Active   meloxicam (Mobic) 15 mg tablet [id number] Yes Take 1 tablet (15 mg) by mouth once daily. Gerson Can,   Active                    Allergies   Allergen Reactions   • Codeine Nausea And Vomiting, Other and Nausea/vomiting       Social History     Socioeconomic History   • Marital status:      Spouse name: Not on file   • Number of children: Not on file   • Years of education: Not on file   • Highest education level: Not on file   Occupational History   • Not on file   Tobacco Use   • Smoking status: Never     Passive exposure: Past   • Smokeless tobacco: Never   Vaping Use   • Vaping status: Never Used   Substance and Sexual Activity   • Alcohol use: Yes     Alcohol/week: 1.0 standard drink of alcohol      Types: 1 Glasses of wine per week     Comment: More lije once a month   • Drug use: Never   • Sexual activity: Yes     Partners: Male     Birth control/protection: Male Sterilization   Other Topics Concern   • Not on file   Social History Narrative   • Not on file     Social Drivers of Health     Financial Resource Strain: Not on file   Food Insecurity: Not on file   Transportation Needs: Not on file   Physical Activity: Not on file   Stress: Not on file   Social Connections: Not on file   Intimate Partner Violence: Not on file   Housing Stability: Not on file       No past surgical history on file.      Electronically Signed      BRO Mendoza MD      Orthopaedic Hand Surgery      [phone]

## 2025-01-28 NOTE — PROGRESS NOTES
CHIEF COMPLAINT         Bilateral CTS    ASSESSMENT + PLAN     ***        HISTORY OF PRESENT ILLNESS       Patient is a 59 y.o. ***-hand dominant female ***, who presents today for evaluation of ***      REVIEW OF SYSTEMS       A 30-item multi-system Review Of Systems was obtained on today's intake form.  This was reviewed with the patient and is correct.  The pertinent positives and negatives are listed above.  The form has been scanned separately into the medical record.      PHYSICAL EXAM    Constitutional:    Appears stated age. Well-developed and well-nourished ***.  Psychiatric:         Pleasant normal mood and affect. Behavior is appropriate for the situation.   Head:                   Normocephalic and atraumatic.  Eyes:                    Pupils are equal and round.  Cardiovascular:  2+ radial and ulnar pulses. Fingers well-perfused.  Respiratory:        Effort normal. No respiratory distress. Speaking in complete sentences.  Neurologic:       Alert and oriented to person, place, and time.  Skin:                Skin is intact, warm and dry.  Hematologic / Lymphatic:    No lymphedema or lymphangitis.    Extremities / Musculoskeletal:                      ***      IMAGING / LABS / EMGs           ***      Past Medical History:   Diagnosis Date    Pain in unspecified shoulder     Shoulder pain    Paresthesia of skin     Paresthesia    Personal history of other diseases of the female genital tract 10/24/2016    History of vaginitis       Medication Documentation Review Audit       Reviewed by NIKA Rose (Nurse Practitioner) on 10/14/24 at 1410      Medication Order Taking? Sig Documenting Provider Last Dose Status   estradiol (Estrace) 0.01 % (0.1 mg/gram) vaginal cream [id number] Yes INSERT 1 GRAM VAGINALLY AT BEDTIME FOR 14 DAYS FOLLOWED BY TWICE WEEKLY NIKA Rose  Active   meloxicam (Mobic) 15 mg tablet [id number] Yes Take 1 tablet (15 mg) by mouth once daily. Gerson Can, DO   Active                    Allergies   Allergen Reactions    Codeine Nausea And Vomiting, Other and Nausea/vomiting       Social History     Socioeconomic History    Marital status:      Spouse name: Not on file    Number of children: Not on file    Years of education: Not on file    Highest education level: Not on file   Occupational History    Not on file   Tobacco Use    Smoking status: Never     Passive exposure: Past    Smokeless tobacco: Never   Vaping Use    Vaping status: Never Used   Substance and Sexual Activity    Alcohol use: Yes     Alcohol/week: 1.0 standard drink of alcohol     Types: 1 Glasses of wine per week     Comment: More lije once a month    Drug use: Never    Sexual activity: Yes     Partners: Male     Birth control/protection: Male Sterilization   Other Topics Concern    Not on file   Social History Narrative    Not on file     Social Drivers of Health     Financial Resource Strain: Not on file   Food Insecurity: Not on file   Transportation Needs: Not on file   Physical Activity: Not on file   Stress: Not on file   Social Connections: Not on file   Intimate Partner Violence: Not on file   Housing Stability: Not on file       No past surgical history on file.      Electronically Signed      BRO Mendoza MD      Orthopaedic Hand Surgery      [phone]   needle component.  Otherwise normal.      Past Medical History:   Diagnosis Date    Pain in unspecified shoulder     Shoulder pain    Paresthesia of skin     Paresthesia    Personal history of other diseases of the female genital tract 10/24/2016    History of vaginitis       Medication Documentation Review Audit       Reviewed by NIKA Rose (Nurse Practitioner) on 10/14/24 at 1410      Medication Order Taking? Sig Documenting Provider Last Dose Status   estradiol (Estrace) 0.01 % (0.1 mg/gram) vaginal cream [id number] Yes INSERT 1 GRAM VAGINALLY AT BEDTIME FOR 14 DAYS FOLLOWED BY TWICE WEEKLY NIKA Rose  Active   meloxicam (Mobic) 15 mg tablet [id number] Yes Take 1 tablet (15 mg) by mouth once daily. Gerson Can DO  Active                    Allergies   Allergen Reactions    Codeine Nausea And Vomiting, Other and Nausea/vomiting       Social History     Socioeconomic History    Marital status:      Spouse name: Not on file    Number of children: Not on file    Years of education: Not on file    Highest education level: Not on file   Occupational History    Not on file   Tobacco Use    Smoking status: Never     Passive exposure: Past    Smokeless tobacco: Never   Vaping Use    Vaping status: Never Used   Substance and Sexual Activity    Alcohol use: Yes     Alcohol/week: 1.0 standard drink of alcohol     Types: 1 Glasses of wine per week     Comment: More lije once a month    Drug use: Never    Sexual activity: Yes     Partners: Male     Birth control/protection: Male Sterilization   Other Topics Concern    Not on file   Social History Narrative    Not on file     Social Drivers of Health     Financial Resource Strain: Not on file   Food Insecurity: Not on file   Transportation Needs: Not on file   Physical Activity: Not on file   Stress: Not on file   Social Connections: Not on file   Intimate Partner Violence: Not on file   Housing Stability: Not on file       No past surgical  history on file.      Electronically Signed      BRO Mendoza MD      Orthopaedic Hand Surgery      [phone]

## 2025-01-28 NOTE — Clinical Note
January 29, 2025     Gerson Can DO  [address]  [address]  [facility] [address]    Patient: Nichelle Padilla   YOB: 1965   Date of Visit: 1/28/2025       Dear Dr. Gerson Can DO:    Thank you for referring Nichelle Padilla to me for evaluation. Below are my notes for this consultation.  If you have questions, please do not hesitate to call me. I look forward to following your patient along with you.       Sincerely,     José Manuel Mendoza MD      CC: No Recipients  ______________________________________________________________________________________

## 2025-02-03 DIAGNOSIS — M79.601 PARESTHESIA AND PAIN OF BOTH UPPER EXTREMITIES: ICD-10-CM

## 2025-02-03 DIAGNOSIS — G89.29 CHRONIC PAIN OF BOTH SHOULDERS: ICD-10-CM

## 2025-02-03 DIAGNOSIS — M79.602 PARESTHESIA AND PAIN OF BOTH UPPER EXTREMITIES: ICD-10-CM

## 2025-02-03 DIAGNOSIS — M25.512 CHRONIC PAIN OF BOTH SHOULDERS: ICD-10-CM

## 2025-02-03 DIAGNOSIS — M25.511 CHRONIC PAIN OF BOTH SHOULDERS: ICD-10-CM

## 2025-02-03 DIAGNOSIS — R20.2 PARESTHESIA AND PAIN OF BOTH UPPER EXTREMITIES: ICD-10-CM

## 2025-02-04 RX ORDER — MELOXICAM 15 MG/1
15 TABLET ORAL DAILY
Qty: 30 TABLET | Refills: 0 | Status: SHIPPED | OUTPATIENT
Start: 2025-02-04

## 2025-02-21 ENCOUNTER — TELEPHONE (OUTPATIENT)
Dept: OBSTETRICS AND GYNECOLOGY | Facility: CLINIC | Age: 60
End: 2025-02-21
Payer: COMMERCIAL

## 2025-02-21 NOTE — TELEPHONE ENCOUNTER
Patient called stating that she is having some mild spotting, cramping and post menopausal. She claims she saw a  specialist for this and also saw Bren a couple years ago.  She would like to know If she should go to aba specialist again or come to see Bren.  She didn't find the Specialist in her my chart.  Please advise.

## 2025-02-24 ENCOUNTER — TELEPHONE (OUTPATIENT)
Dept: OBSTETRICS AND GYNECOLOGY | Facility: CLINIC | Age: 60
End: 2025-02-24
Payer: COMMERCIAL

## 2025-02-24 NOTE — TELEPHONE ENCOUNTER
You can offer her an appointment with Bren at the [place] office 03/03/2025 at 11:00 am. Patient is welcome to seeing another provider/  physician if they have a sooner appointment to offer for an evaluation (New physicians in [place] may have a sooner appt)

## 2025-02-24 NOTE — TELEPHONE ENCOUNTER
Patient Is experiencing post-menopausal bleeding and is wondering if this is something she should be seen asap for with a Doctor or if it's ok to wait for Bren's next available. She has surgery scheduled on 03/11 and ideally would like to get in beforehand if we can find an opening.

## 2025-02-26 PROBLEM — G56.03 BILATERAL CARPAL TUNNEL SYNDROME: Status: ACTIVE | Noted: 2025-02-26

## 2025-03-03 ENCOUNTER — APPOINTMENT (OUTPATIENT)
Dept: OBSTETRICS AND GYNECOLOGY | Facility: CLINIC | Age: 60
End: 2025-03-03
Payer: COMMERCIAL

## 2025-03-03 VITALS — DIASTOLIC BLOOD PRESSURE: 72 MMHG | BODY MASS INDEX: 29 KG/M2 | SYSTOLIC BLOOD PRESSURE: 110 MMHG | WEIGHT: 179.68 LBS

## 2025-03-03 DIAGNOSIS — N95.1 VASOMOTOR SYMPTOMS DUE TO MENOPAUSE: ICD-10-CM

## 2025-03-03 DIAGNOSIS — Z11.51 SCREENING FOR HPV (HUMAN PAPILLOMAVIRUS): ICD-10-CM

## 2025-03-03 DIAGNOSIS — Z80.3 FAMILY HISTORY OF BREAST CANCER: ICD-10-CM

## 2025-03-03 DIAGNOSIS — Z12.4 SCREENING FOR CERVICAL CANCER: ICD-10-CM

## 2025-03-03 DIAGNOSIS — N95.0 PMB (POSTMENOPAUSAL BLEEDING): Primary | ICD-10-CM

## 2025-03-03 PROCEDURE — 87624 HPV HI-RISK TYP POOLED RSLT: CPT

## 2025-03-03 PROCEDURE — 99214 OFFICE O/P EST MOD 30 MIN: CPT | Performed by: NURSE PRACTITIONER

## 2025-03-03 PROCEDURE — 88175 CYTOPATH C/V AUTO FLUID REDO: CPT

## 2025-03-03 RX ORDER — ESTRADIOL 0.1 MG/G
CREAM VAGINAL
Qty: 42.5 G | Refills: 3 | Status: SHIPPED | OUTPATIENT
Start: 2025-03-03

## 2025-03-03 NOTE — PROGRESS NOTES
Subjective   Patient ID: Nichelle Padilla is a 59 y.o. female who presents for Post Menopausal Bleeding (Reviewing  EMR/Last Annual Exam: 10/14/2024/Negative Pap / Negative HPV 2022/).  HPI  Here with c/o PMB. Had some cramping and some bleeding that lasted 7 days. Bleeding was light, just using a panty liner. Bleeding started around Feb. 22. Had been having some sharp pain in the abdomen, that only lasts for one second.   Final period was in her early 50's. Around 2021. Didn't have a period for a year, then had one episode of bleeding. She was seen by Dr Munson and was dx with uterine fibroids.   Denies any change in activity, no recent intercourse. No recent illness.   She is using one gram of estradiol one to two times per week.   She stopped using the estradiol when she was having some bleeding.   She has a family hx of breast cancer, but no uterine cancer.   No one has been tested for BRCA in her family.   Remote hx of abnormal PAP. Last PAP in 2022 was NILM, negative HPV.     Review of Systems   Genitourinary:  Positive for menstrual problem.   All other systems reviewed and are negative.      Objective   Physical Exam  Pulmonary:      Effort: Pulmonary effort is normal.   Genitourinary:     General: Normal vulva.      Vagina: Normal.      Cervix: Normal.      Uterus: Normal.       Adnexa: Right adnexa normal and left adnexa normal.      Comments: No bleeding noted on exam.   Skin:     General: Skin is warm and dry.   Psychiatric:         Mood and Affect: Mood normal.         Behavior: Behavior normal.         Assessment/Plan   Diagnoses and all orders for this visit:  PMB (postmenopausal bleeding)  -     US PELVIS TRANSABDOMINAL WITH TRANSVAGINAL; Future  Family history of breast cancer  -     Referral to Genetics; Future  Vasomotor symptoms due to menopause  -     estradiol (Estrace) 0.01 % (0.1 mg/gram) vaginal cream; Insert one gram into the vagina twice weekly.  Screening for cervical cancer  -      THINPREP PAP TEST (>30)  -     HPV DNA High Risk With Genotype  Screening for HPV (human papillomavirus)  -     THINPREP PAP TEST (>30)  -     HPV DNA High Risk With Genotype  Follow-up pending results of testing for next steps in care.        Bren Bustillo, NETTIE-CNP 03/03/25 11:07 AM

## 2025-03-06 ENCOUNTER — HOSPITAL ENCOUNTER (OUTPATIENT)
Dept: RADIOLOGY | Facility: CLINIC | Age: 60
Discharge: HOME | End: 2025-03-06
Payer: COMMERCIAL

## 2025-03-06 DIAGNOSIS — N95.0 PMB (POSTMENOPAUSAL BLEEDING): ICD-10-CM

## 2025-03-06 PROCEDURE — 76856 US EXAM PELVIC COMPLETE: CPT

## 2025-03-07 DIAGNOSIS — N83.202 LEFT OVARIAN CYST: ICD-10-CM

## 2025-03-07 DIAGNOSIS — R93.89 THICKENED ENDOMETRIUM: Primary | ICD-10-CM

## 2025-03-10 ENCOUNTER — TELEPHONE (OUTPATIENT)
Dept: OBSTETRICS AND GYNECOLOGY | Facility: CLINIC | Age: 60
End: 2025-03-10
Payer: COMMERCIAL

## 2025-03-10 NOTE — TELEPHONE ENCOUNTER
Results were reviewed with patient. Information for central scheduling to schedule follow up on ultrasound. Patient was transferred to scheduling to schedule EMB.

## 2025-03-10 NOTE — TELEPHONE ENCOUNTER
----- Message from Bren Zarate sent at 3/7/2025  4:28 PM EST -----  Please call the patient and let her know that her endometrial lining is 0.6 cm. I would like her to follow-up with the physicians for an EMB. She will need a repeat ultrasound to follow-up on a possible cystic area seen near the left ovary. I will place that follow-up order now (pending any change in her plan of care).

## 2025-03-17 ENCOUNTER — APPOINTMENT (OUTPATIENT)
Dept: OBSTETRICS AND GYNECOLOGY | Facility: CLINIC | Age: 60
End: 2025-03-17
Payer: COMMERCIAL

## 2025-03-18 ENCOUNTER — TELEMEDICINE (OUTPATIENT)
Dept: OBSTETRICS AND GYNECOLOGY | Facility: CLINIC | Age: 60
End: 2025-03-18
Payer: COMMERCIAL

## 2025-03-18 DIAGNOSIS — N83.202 CYST OF LEFT OVARY: Primary | ICD-10-CM

## 2025-03-18 LAB
CYTOLOGY CMNT CVX/VAG CYTO-IMP: NORMAL
HPV HR 12 DNA GENITAL QL NAA+PROBE: NEGATIVE
HPV HR GENOTYPES PNL CVX NAA+PROBE: NEGATIVE
HPV16 DNA SPEC QL NAA+PROBE: NEGATIVE
HPV18 DNA SPEC QL NAA+PROBE: NEGATIVE
LAB AP HPV GENOTYPE QUESTION: YES
LAB AP HPV HR: NORMAL
LABORATORY COMMENT REPORT: NORMAL
PATH REPORT.TOTAL CANCER: NORMAL

## 2025-03-18 PROCEDURE — 99212 OFFICE O/P EST SF 10 MIN: CPT | Performed by: NURSE PRACTITIONER

## 2025-03-18 NOTE — PROGRESS NOTES
Subjective   Patient ID: Nichelle Padilla is a 59 y.o. female who presents for Follow-up (Pelvic Ultrasound 03/06/2025/-virtual visit-).  HPI    Review of Systems    Objective   Physical Exam    Assessment/Plan   {Assess/PlanSmartLinks:55258}         NETTIE Erickson-CNP 03/18/25 11:59 AM      Physical Exam  Constitutional:       Appearance: Normal appearance.   Pulmonary:      Effort: Pulmonary effort is normal.   Skin:     Coloration: Skin is not pale.      Findings: No erythema.   Neurological:      Mental Status: She is alert.   Psychiatric:         Mood and Affect: Mood normal.         Behavior: Behavior normal.         Assessment/Plan   Diagnoses and all orders for this visit:  Cyst of left ovary  -     Cancer Antigen 125; Future  Discussed results of ultrasound and next steps in care. She is agreeable to a CA-125 lab test. He will follow-up for EMB. A repeat ultrasound order has been placed to monitor her ovarian cyst.        NETTIE Erickson-CNP 03/18/25 11:59 AM

## 2025-03-19 LAB — CANCER AG125 SERPL-ACNC: 9 U/ML

## 2025-04-07 ENCOUNTER — APPOINTMENT (OUTPATIENT)
Dept: PRIMARY CARE | Facility: CLINIC | Age: 60
End: 2025-04-07
Payer: COMMERCIAL

## 2025-04-07 VITALS
BODY MASS INDEX: 30.7 KG/M2 | TEMPERATURE: 97.2 F | WEIGHT: 190.2 LBS | DIASTOLIC BLOOD PRESSURE: 80 MMHG | SYSTOLIC BLOOD PRESSURE: 138 MMHG

## 2025-04-07 DIAGNOSIS — E78.5 HYPERLIPIDEMIA, UNSPECIFIED HYPERLIPIDEMIA TYPE: Primary | ICD-10-CM

## 2025-04-07 DIAGNOSIS — R73.03 PREDIABETES: ICD-10-CM

## 2025-04-07 PROCEDURE — 1036F TOBACCO NON-USER: CPT | Performed by: FAMILY MEDICINE

## 2025-04-07 PROCEDURE — 99214 OFFICE O/P EST MOD 30 MIN: CPT | Performed by: FAMILY MEDICINE

## 2025-04-07 ASSESSMENT — PATIENT HEALTH QUESTIONNAIRE - PHQ9
1. LITTLE INTEREST OR PLEASURE IN DOING THINGS: NOT AT ALL
2. FEELING DOWN, DEPRESSED OR HOPELESS: NOT AT ALL
SUM OF ALL RESPONSES TO PHQ9 QUESTIONS 1 AND 2: 0

## 2025-04-07 NOTE — PROGRESS NOTES
Subjective   Patient ID: Nichelle Padilla is a 59 y.o. female who presents for Follow-up.    HPI   Here today for follow-up    Getting EBM by Dr Munson 4/11/25 for some postmenopausal bleeding which has stopped    In terms of her elevated sugar and cholesterol she is trying to work on healthy diet and activity.  She had been doing weight watchers but not following it strictly  Exercise goal is 4 days a week- spinning classes, light weights etc. she is wondering about other options to help with weight loss  She has never been on medication for weight loss and unsure if she wants to start at this point    She is also investigating seeing a hormone specialist once she is done with workup by GYN for postmenopausal bleeding as noted HPI    Review of Systems    Objective   /80   Temp 36.2 °C (97.2 °F)   Wt 86.3 kg (190 lb 3.2 oz)   BMI 30.70 kg/m²     Physical Exam  Vitals and nursing note reviewed.   Constitutional:       Appearance: Normal appearance.   HENT:      Head: Normocephalic and atraumatic.      Right Ear: External ear normal.      Left Ear: External ear normal.      Mouth/Throat:      Mouth: Mucous membranes are moist.   Eyes:      Conjunctiva/sclera: Conjunctivae normal.   Cardiovascular:      Rate and Rhythm: Normal rate.   Pulmonary:      Effort: Pulmonary effort is normal.   Musculoskeletal:         General: No swelling.      Cervical back: Normal range of motion and neck supple.      Right lower leg: No edema.      Left lower leg: No edema.   Skin:     General: Skin is warm and dry.      Capillary Refill: Capillary refill takes less than 2 seconds.   Neurological:      General: No focal deficit present.      Mental Status: She is alert. Mental status is at baseline.   Psychiatric:         Mood and Affect: Mood normal.         Behavior: Behavior normal.         Thought Content: Thought content normal.         Judgment: Judgment normal.         Assessment/Plan   1. Hyperlipidemia, unspecified  hyperlipidemia type        2. Prediabetes          Risks, benefits ,side effects and alternatives of GLP-1 medication discussed at length  Patient has no contraindications to use  Discussed starting dose and titration  At this point unsure how she wants to proceed but she will let me know  In the meantime recheck labs for hyperlipidemia and prediabetes.  Discussed dietary and lifestyle modifications  Continue to work with gynecology  Patient verbalized understanding of plan of care and all questions were answered.       Gerson Can, DO

## 2025-04-11 ENCOUNTER — APPOINTMENT (OUTPATIENT)
Dept: OBSTETRICS AND GYNECOLOGY | Facility: CLINIC | Age: 60
End: 2025-04-11
Payer: COMMERCIAL

## 2025-04-18 LAB
ALBUMIN SERPL-MCNC: 4.3 G/DL (ref 3.6–5.1)
ALP SERPL-CCNC: 80 U/L (ref 37–153)
ALT SERPL-CCNC: 16 U/L (ref 6–29)
ANION GAP SERPL CALCULATED.4IONS-SCNC: 9 MMOL/L (CALC) (ref 7–17)
AST SERPL-CCNC: 18 U/L (ref 10–35)
BILIRUB SERPL-MCNC: 1 MG/DL (ref 0.2–1.2)
BUN SERPL-MCNC: 13 MG/DL (ref 7–25)
CALCIUM SERPL-MCNC: 9.2 MG/DL (ref 8.6–10.4)
CHLORIDE SERPL-SCNC: 102 MMOL/L (ref 98–110)
CHOLEST SERPL-MCNC: 270 MG/DL
CHOLEST/HDLC SERPL: 3.8 (CALC)
CO2 SERPL-SCNC: 29 MMOL/L (ref 20–32)
CREAT SERPL-MCNC: 0.82 MG/DL (ref 0.5–1.03)
EGFRCR SERPLBLD CKD-EPI 2021: 83 ML/MIN/1.73M2
EST. AVERAGE GLUCOSE BLD GHB EST-MCNC: 120 MG/DL
EST. AVERAGE GLUCOSE BLD GHB EST-SCNC: 6.6 MMOL/L
GLUCOSE SERPL-MCNC: 97 MG/DL (ref 65–99)
HBA1C MFR BLD: 5.8 %
HDLC SERPL-MCNC: 72 MG/DL
LDLC SERPL CALC-MCNC: 166 MG/DL (CALC)
NONHDLC SERPL-MCNC: 198 MG/DL (CALC)
POTASSIUM SERPL-SCNC: 4.3 MMOL/L (ref 3.5–5.3)
PROT SERPL-MCNC: 6.8 G/DL (ref 6.1–8.1)
SODIUM SERPL-SCNC: 140 MMOL/L (ref 135–146)
TRIGL SERPL-MCNC: 168 MG/DL

## 2025-04-21 ENCOUNTER — APPOINTMENT (OUTPATIENT)
Dept: OBSTETRICS AND GYNECOLOGY | Facility: CLINIC | Age: 60
End: 2025-04-21
Payer: COMMERCIAL

## 2025-04-23 ENCOUNTER — TELEPHONE (OUTPATIENT)
Dept: PRIMARY CARE | Facility: CLINIC | Age: 60
End: 2025-04-23
Payer: COMMERCIAL

## 2025-04-23 NOTE — TELEPHONE ENCOUNTER
Spoke with patient, she verbalized understanding. She is interested in speaking with Pooja but she would like to wait a few weeks. She has the biopsy scheduled for Monday and wants to get the results/answers from that before starting anything.

## 2025-04-23 NOTE — TELEPHONE ENCOUNTER
----- Message from Gerson Can sent at 4/22/2025  1:39 PM EDT -----  Please call the patient regarding her abnormal result.  Please let Nichelle know her cholesterol and sugar continue to slowly climb.  I know at our recent visit we talked about improvements on diet and exercise I still feel like that is reasonable.  We also   discussed GLP medication.  If she is interested in that we can put in a referral for Pooja  If she wants to continue to work on things I would suggest follow-up in 6 months plus labs as scheduled  ----- Message -----  From: Yelena Trip[facility] Results In  Sent: 4/17/2025   9:51 PM EDT  To: Gerson Can, DO

## 2025-04-28 ENCOUNTER — APPOINTMENT (OUTPATIENT)
Dept: OBSTETRICS AND GYNECOLOGY | Facility: CLINIC | Age: 60
End: 2025-04-28
Payer: COMMERCIAL

## 2025-04-28 VITALS — BODY MASS INDEX: 30.02 KG/M2 | DIASTOLIC BLOOD PRESSURE: 82 MMHG | WEIGHT: 186 LBS | SYSTOLIC BLOOD PRESSURE: 138 MMHG

## 2025-04-28 DIAGNOSIS — N95.0 PMB (POSTMENOPAUSAL BLEEDING): ICD-10-CM

## 2025-04-28 PROCEDURE — 58100 BIOPSY OF UTERUS LINING: CPT | Performed by: OBSTETRICS & GYNECOLOGY

## 2025-04-28 NOTE — PROGRESS NOTES
Patient ID: Nichelle Padilla is a 59 y.o. female, here for EMB  H/o PMB, started 2 months ago, had spotting for 2 weeks, pinkish, accompanied by yellowish discharge. She is also c/o pelvic pain, mostly in LLQ for 2 months, aggravated by lifting.  Denies any urinary problems  Pelvic US with uterine fibroids and thickened endometrium    Endometrial biopsy    Date/Time: 4/28/2025 1:29 PM    Performed by: Le Munson MD  Authorized by: Le Munson MD    Consent:     Consent obtained: written    Consent given by: patient    Risks discussed: bleeding, infection and pain    Alternatives discussed: alternative treatment    Patient agrees, verbalizes understanding, and wants to proceed: yes    Indications:     Indications: postmenopausal bleeding      Chronicity of post-menopausal bleeding: recurrent    Progression of post-menopausal bleeding: unchanged  Pre-procedure:     Urine pregnancy test: N/A      Premeds: NSAID and acetaminophen    Procedure:     A bimanual exam was performed: no      Prepped with: Betadine    Tenaculum used: yes      A local block was performed: no      Cervix dilated: yes      Number of passes: 2  Findings:     Cervix: stenotic      Specimen collected: low volume sample collected      Patient tolerance: tolerated well, no immediate complications  Comments:     Procedure comments: Low volume endometrial sampling collected.  Discussed possibility of hysteroscopy D&C under anesthesia, if sampling is not adequate.

## 2025-05-05 ENCOUNTER — APPOINTMENT (OUTPATIENT)
Dept: OBSTETRICS AND GYNECOLOGY | Facility: CLINIC | Age: 60
End: 2025-05-05
Payer: COMMERCIAL

## 2025-05-06 LAB
LABORATORY COMMENT REPORT: NORMAL
PATH REPORT.FINAL DX SPEC: NORMAL
PATH REPORT.GROSS SPEC: NORMAL
PATH REPORT.RELEVANT HX SPEC: NORMAL
PATH REPORT.TOTAL CANCER: NORMAL

## 2025-05-15 ENCOUNTER — TELEPHONE (OUTPATIENT)
Dept: OBSTETRICS AND GYNECOLOGY | Facility: CLINIC | Age: 60
End: 2025-05-15
Payer: COMMERCIAL

## 2025-05-15 DIAGNOSIS — N95.1 VASOMOTOR SYMPTOMS DUE TO MENOPAUSE: ICD-10-CM

## 2025-05-15 RX ORDER — ESTRADIOL 0.1 MG/G
CREAM VAGINAL
Qty: 42.5 G | Refills: 3 | Status: SHIPPED | OUTPATIENT
Start: 2025-05-15

## 2025-05-15 NOTE — TELEPHONE ENCOUNTER
Patient would like a refill on:    Estradiol 0.01% vaginal cream  Insert one gram into the vagina twice weekly  3 RF's    [facility]  [place]

## 2025-05-19 ENCOUNTER — APPOINTMENT (OUTPATIENT)
Dept: OBSTETRICS AND GYNECOLOGY | Facility: CLINIC | Age: 60
End: 2025-05-19
Payer: COMMERCIAL

## 2025-05-19 VITALS — DIASTOLIC BLOOD PRESSURE: 82 MMHG | SYSTOLIC BLOOD PRESSURE: 120 MMHG | BODY MASS INDEX: 30.34 KG/M2 | WEIGHT: 188 LBS

## 2025-05-19 DIAGNOSIS — R93.89 THICKENED ENDOMETRIUM: ICD-10-CM

## 2025-05-19 DIAGNOSIS — D25.1 INTRAMURAL LEIOMYOMA OF UTERUS: Primary | ICD-10-CM

## 2025-05-19 DIAGNOSIS — N83.202 CYST OF LEFT OVARY: ICD-10-CM

## 2025-05-19 DIAGNOSIS — N95.1 POSTMENOPAUSAL SYNDROME: ICD-10-CM

## 2025-05-19 PROCEDURE — 99213 OFFICE O/P EST LOW 20 MIN: CPT | Performed by: OBSTETRICS & GYNECOLOGY

## 2025-05-19 NOTE — PROGRESS NOTES
SUBJECTIVE  Nichelle Padilla is a 59 y.o. female here for gyn follow up  History of postmenopausal bleeding, endometrial biopsy was benign  She still has occasional mild pain in the left lower quadrant.  Her pelvic ultrasound was positive for a left ovarian cyst  Complains of hot flashes and night sweats  GynHx:  Menopause: Postmenopausal  Postmenopausal sx: Yes left ovary  STIs: denies  Sexual Activity: men, monogamous, no complaints      [unfilled]  Medical History[1]   Surgical History[2]     OBJECTIVE  /82   Wt 85.3 kg (188 lb)   BMI 30.34 kg/m²      General:   Alert and oriented, in no acute distress   Neck: Supple. No visible thyromegaly.    Abdomen: Soft, non-tender, without masses or organomegaly   Skin: Dry and warm, no lesions noted   Musculoskeletal:  Normal range of motion, normal gait , no muscle weakness   Psych Normal affect. Normal mood.      Problem List Items Addressed This Visit       Thickened endometrium    Postmenopausal syndrome    Intramural leiomyoma of uterus - Primary    Cyst of left ovary    Relevant Orders    US PELVIS TRANSABDOMINAL WITH TRANSVAGINAL      IMPRESSIONS:  Pelvic ultrasound done 3 months ago reviewed  UTERUS:  The uterus measures  5.0 cm x 3.9 cm x 8.9 cm. Small anterior fibroid  measuring approximately 13 x 14 x 16 mm.      ENDOMETRIUM:  The endometrium measures a thickness of 0.6 cm, which is abnormally  thickened for a postmenopausal patient.      RIGHT ADNEXA:  The right ovary measures 1.6 cm x 1.2 cm x 1.6 cm and demonstrates  normal flow. No gross right adnexal masses are seen, no hydrosalpinx.      LEFT ADNEXA:  The left ovary is not seen. Cystic area in the left adnexa may  represent the left ovarian cyst or nonspecific fluid collection  measuring 15 x 10 x 8 mm.  Will repeat pelvic ultrasound due to ongoing left ovarian pain and to reevaluate left ovarian cyst and uterine fibroids  Discussed management of postmenopausal symptoms, she will try OTC  Amberen.  Follow-up as needed.  More than 50% of time was utilized for counseling       Le Munson MD         [1]   Past Medical History:  Diagnosis Date    Pain in unspecified shoulder     Shoulder pain    Paresthesia of skin     Paresthesia    Personal history of other diseases of the female genital tract 10/24/2016    History of vaginitis   [2]   Past Surgical History:  Procedure Laterality Date    CARPAL TUNNEL RELEASE Right 03/11/2025    [facility]

## 2025-05-27 DIAGNOSIS — N95.1 VASOMOTOR SYMPTOMS DUE TO MENOPAUSE: ICD-10-CM

## 2025-05-27 RX ORDER — ESTRADIOL 0.1 MG/G
CREAM VAGINAL
Qty: 42.5 G | Refills: 3 | Status: SHIPPED | OUTPATIENT
Start: 2025-05-27

## 2025-05-27 RX ORDER — ESTRADIOL 0.1 MG/G
2 CREAM VAGINAL 2 TIMES WEEKLY
Qty: 42.5 G | Refills: 3 | Status: SHIPPED | OUTPATIENT
Start: 2025-05-27 | End: 2025-05-27

## 2025-05-28 ENCOUNTER — APPOINTMENT (OUTPATIENT)
Dept: PHARMACY | Facility: HOSPITAL | Age: 60
End: 2025-05-28
Payer: COMMERCIAL

## 2025-05-28 ENCOUNTER — HOSPITAL ENCOUNTER (OUTPATIENT)
Dept: RADIOLOGY | Facility: CLINIC | Age: 60
Discharge: HOME | End: 2025-05-28
Payer: COMMERCIAL

## 2025-05-28 DIAGNOSIS — N83.202 CYST OF LEFT OVARY: ICD-10-CM

## 2025-05-28 DIAGNOSIS — R73.03 PREDIABETES: ICD-10-CM

## 2025-05-28 DIAGNOSIS — E78.5 HYPERLIPIDEMIA, UNSPECIFIED HYPERLIPIDEMIA TYPE: ICD-10-CM

## 2025-05-28 PROCEDURE — 76830 TRANSVAGINAL US NON-OB: CPT | Performed by: STUDENT IN AN ORGANIZED HEALTH CARE EDUCATION/TRAINING PROGRAM

## 2025-05-28 PROCEDURE — 76856 US EXAM PELVIC COMPLETE: CPT

## 2025-05-28 PROCEDURE — 76856 US EXAM PELVIC COMPLETE: CPT | Performed by: STUDENT IN AN ORGANIZED HEALTH CARE EDUCATION/TRAINING PROGRAM

## 2025-05-28 NOTE — PROGRESS NOTES
Clinical Pharmacy Appointment    Patient ID: Nichelle Padilla is a 59 y.o. female who presents for Weight Management.    Pt is here for First appointment.     Referring Provider: Gerson Can DO  PCP: Gerson Can DO   Last visit with PCP: 4/7/2025   Next visit with PCP: 10/13/2025      Subjective     HPI  WEIGHT MANAGEMENT  PMH significant for hyperlipidemia and prediabetes  Family history of diabetes and cardiovascular disease  Special needs/barriers to therapy: none known at this time    BMI Readings from Last 1 Encounters:   05/19/25 30.34 kg/m²     Lab Results   Component Value Date    HGBA1C 5.8 (H) 04/17/2025    GLUCOSE 97 04/17/2025     Lifestyle  Diet: 3 meals/day with snacks, sweet tooth, room for improvement  Has worked with nutritionist/dietician? Yes, a long time ago  Physical Activity: goal 3-4 class per week cardio/spinning and functional strength classes, currently going 1-2 times per week, hiking with dogs  Alcohol Use: occasional glass of wine, once per month    Non-Pharmacological Therapy  Weight loss techniques attempted:  Self-directed dieting: reduced calorie  Commercial weight loss program: Weight Watchers    Pharmacological Therapy  Current Medications: None for weight loss/prediabetes  Previous Medications: None for weight loss/prediabetes     Pertinent PMH Review:  PMH of Pancreatitis: No  PMH of Gallbladder Disease: No  PMH of Retinopathy: No  PMH of MTC: No     Preventative Care  ASCVD Risk:   The 10-year ASCVD risk score (Katja DK, et al., 2019) is: 2.5%    Values used to calculate the score:      Age: 59 years      Sex: Female      Is Non- : No      Diabetic: No      Tobacco smoker: No      Systolic Blood Pressure: 120 mmHg      Is BP treated: No      HDL Cholesterol: 72 mg/dL      Total Cholesterol: 270 mg/dL  On Statin: No      Medication System Management  Patient's preferred pharmacy: [facility] in [place], [place]  Adherence/Organization: no  "concerns identified  Affordability/Accessibility: unknown cost/coverage of GLP1s  Insurance coverage of weight-loss medications? Requires prior authorization  Eligible for copay cards/programs? Yes      Objective   Allergies[1]  Social History     Social History Narrative    Not on file      Medication Review  Current Outpatient Medications   Medication Instructions    estradiol (Estrace) 0.01 % (0.1 mg/gram) vaginal cream Insert two grams into the vagina twice weekly.    meloxicam (MOBIC) 15 mg, oral, Daily      Vitals  BP Readings from Last 2 Encounters:   05/19/25 120/82   04/28/25 138/82     BMI Readings from Last 1 Encounters:   05/19/25 30.34 kg/m²      Labs  A1C  Lab Results   Component Value Date    HGBA1C 5.8 (H) 04/17/2025    HGBA1C 5.7 (H) 10/07/2024     BMP  Lab Results   Component Value Date    CALCIUM 9.2 04/17/2025     04/17/2025    K 4.3 04/17/2025    CO2 29 04/17/2025     04/17/2025    BUN 13 04/17/2025    CREATININE 0.82 04/17/2025    EGFR 83 04/17/2025     LFTs  Lab Results   Component Value Date    ALT 16 04/17/2025    AST 18 04/17/2025    ALKPHOS 80 04/17/2025    BILITOT 1.0 04/17/2025     FLP  Lab Results   Component Value Date    TRIG 168 (H) 04/17/2025    CHOL 270 (H) 04/17/2025    LDLF 149 (H) 07/12/2022    LDLCALC 166 (H) 04/17/2025    HDL 72 04/17/2025     Urine Microalbumin  No results found for: \"MICROALBCREA\"  Weight Management  Wt Readings from Last 3 Encounters:   05/19/25 85.3 kg (188 lb)   04/28/25 84.4 kg (186 lb)   04/07/25 86.3 kg (190 lb 3.2 oz)      There is no height or weight on file to calculate BMI.     Assessment/Plan   Problem List Items Addressed This Visit       Hyperlipemia    Relevant Orders    Referral to Clinical Pharmacy     Other Visit Diagnoses         Prediabetes        Relevant Orders    Referral to Clinical Pharmacy      BMI 30.0-30.9,adult        Relevant Orders    Referral to Clinical Pharmacy            DISCUSSION/PLAN:  Patient with " hyperlipidemia and prediabetes considering starting GLP1 agonist for weight loss, blood glucose control, and other benefits. Pharmacy test claims indicate that prior authorization is required for Wegovy or Zepbound. Prefer Zepbound due to greater weight loss potential with dual mechanism. Will submit prior authorization and follow up in one week with determination.    No medication changes at this time    Future Considerations:  Possible upcoming insurance changes    Monitoring and Education:  Counseled on GLP1/GIP mechanism of action, benefits, duration of therapy, side effects, monitoring, and potential complications  Answered all patient questions    Continue all meds under the continuation of care with the referring provider and clinical pharmacy team.    Clinical Pharmacist follow-up: June 5th, 2025 at 2:40 PM  Orders placed: none at this time    Thank you,   Pooja Lu, PharmD  Clinical Pharmacy Specialist, Primary Care   [phone]    Verbal consent to manage patient's drug therapy was obtained from the patient . Patient was informed she may decline to participate or withdraw from participation in pharmacy services at any time.         [1]   Allergies  Allergen Reactions    Codeine Nausea And Vomiting, Other and Nausea/vomiting

## 2025-06-05 ENCOUNTER — APPOINTMENT (OUTPATIENT)
Dept: PHARMACY | Facility: HOSPITAL | Age: 60
End: 2025-06-05
Payer: COMMERCIAL

## 2025-06-05 DIAGNOSIS — R73.03 PREDIABETES: ICD-10-CM

## 2025-06-05 DIAGNOSIS — E78.5 HYPERLIPIDEMIA, UNSPECIFIED HYPERLIPIDEMIA TYPE: ICD-10-CM

## 2025-06-05 NOTE — PROGRESS NOTES
Clinical Pharmacy Appointment    Patient ID: Nichelle Padilla is a 59 y.o. female who presents for Weight Management.    Pt is here for Follow Up appointment.     Referring Provider: Gerson Can DO  PCP: Gerson Can DO   Last visit with PCP: 4/7/2025   Next visit with PCP: 10/13/2025      Subjective     HPI  WEIGHT MANAGEMENT  PMH significant for hyperlipidemia and prediabetes  Family history of diabetes and cardiovascular disease  Special needs/barriers to therapy: none known at this time    BMI Readings from Last 1 Encounters:   05/19/25 30.34 kg/m²     Lab Results   Component Value Date    HGBA1C 5.8 (H) 04/17/2025    GLUCOSE 97 04/17/2025     Lifestyle  Diet: 3 meals/day with snacks, sweet tooth, room for improvement  Has worked with nutritionist/dietician? Yes, a long time ago  Physical Activity: goal 3-4 class per week cardio/spinning and functional strength classes, currently going 1-2 times per week, hiking with dogs  Alcohol Use: occasional glass of wine, once per month    Non-Pharmacological Therapy  Weight loss techniques attempted:  Self-directed dieting: reduced calorie  Commercial weight loss program: Weight Watchers    Pharmacological Therapy  Current Medications: None for weight loss/prediabetes  Previous Medications: None for weight loss/prediabetes     Pertinent PMH Review:  PMH of Pancreatitis: No  PMH of Gallbladder Disease: No  PMH of Retinopathy: No  PMH of MTC: No     Preventative Care  ASCVD Risk:   The 10-year ASCVD risk score (Katja DK, et al., 2019) is: 2.5%    Values used to calculate the score:      Age: 59 years      Sex: Female      Is Non- : No      Diabetic: No      Tobacco smoker: No      Systolic Blood Pressure: 120 mmHg      Is BP treated: No      HDL Cholesterol: 72 mg/dL      Total Cholesterol: 270 mg/dL  On Statin: No      Medication System Management  Patient's preferred pharmacy: [facility] in [place], [place]  Adherence/Organization: no  "concerns identified  Affordability/Accessibility: unknown cost/coverage of GLP1s  Zepbound prior authorization approved through January 23, 2026  Eligible for copay cards/programs? Yes      Objective   Allergies[1]  Social History     Social History Narrative    Not on file      Medication Review  Current Outpatient Medications   Medication Instructions    estradiol (Estrace) 0.01 % (0.1 mg/gram) vaginal cream Insert two grams into the vagina twice weekly.    meloxicam (MOBIC) 15 mg, oral, Daily      Vitals  BP Readings from Last 2 Encounters:   05/19/25 120/82   04/28/25 138/82     BMI Readings from Last 1 Encounters:   05/19/25 30.34 kg/m²      Labs  A1C  Lab Results   Component Value Date    HGBA1C 5.8 (H) 04/17/2025    HGBA1C 5.7 (H) 10/07/2024     BMP  Lab Results   Component Value Date    CALCIUM 9.2 04/17/2025     04/17/2025    K 4.3 04/17/2025    CO2 29 04/17/2025     04/17/2025    BUN 13 04/17/2025    CREATININE 0.82 04/17/2025    EGFR 83 04/17/2025     LFTs  Lab Results   Component Value Date    ALT 16 04/17/2025    AST 18 04/17/2025    ALKPHOS 80 04/17/2025    BILITOT 1.0 04/17/2025     FLP  Lab Results   Component Value Date    TRIG 168 (H) 04/17/2025    CHOL 270 (H) 04/17/2025    LDLF 149 (H) 07/12/2022    LDLCALC 166 (H) 04/17/2025    HDL 72 04/17/2025     Urine Microalbumin  No results found for: \"MICROALBCREA\"  Weight Management  Wt Readings from Last 3 Encounters:   05/19/25 85.3 kg (188 lb)   04/28/25 84.4 kg (186 lb)   04/07/25 86.3 kg (190 lb 3.2 oz)      There is no height or weight on file to calculate BMI.     Assessment/Plan   Problem List Items Addressed This Visit       Hyperlipemia     Other Visit Diagnoses         Prediabetes          BMI 30.0-30.9,adult                DISCUSSION/PLAN:  Previously discussed starting Zepbound for weight loss, blood glucose control, and other benefits. Prior authorization was approved, but unable to process through insurance at this time due to " lapse in coverage. Patient to follow up with insurance she should be now covered under CO[employer]. She does not plan to start medication until after hand surgery at the end of June. She will reach out after clarifying pharmacy benefits with insurance.    No medication changes at this time    Future Considerations:  Possible upcoming insurance changes    Monitoring and Education:  Counseled on Zepbound mechanism of action, benefits, side effects, and monitoring  Answered all patient questions    Continue all meds under the continuation of care with the referring provider and clinical pharmacy team.    Clinical Pharmacist follow-up: as needed  Orders placed: none at this time    Thank you,   Pooja Lu, PharmD  Clinical Pharmacy Specialist, Primary Care   [phone]    Verbal consent to manage patient's drug therapy was obtained from the patient . Patient was informed she may decline to participate or withdraw from participation in pharmacy services at any time.         [1]   Allergies  Allergen Reactions    Codeine Nausea And Vomiting, Other and Nausea/vomiting

## 2025-06-09 ENCOUNTER — APPOINTMENT (OUTPATIENT)
Dept: RADIOLOGY | Facility: CLINIC | Age: 60
End: 2025-06-09
Payer: COMMERCIAL

## 2025-06-16 ENCOUNTER — TELEMEDICINE (OUTPATIENT)
Dept: PHARMACY | Facility: HOSPITAL | Age: 60
End: 2025-06-16
Payer: COMMERCIAL

## 2025-06-16 DIAGNOSIS — R73.03 PREDIABETES: ICD-10-CM

## 2025-06-16 DIAGNOSIS — E78.5 HYPERLIPIDEMIA, UNSPECIFIED HYPERLIPIDEMIA TYPE: ICD-10-CM

## 2025-06-16 DIAGNOSIS — E78.5 HYPERLIPIDEMIA, UNSPECIFIED HYPERLIPIDEMIA TYPE: Primary | ICD-10-CM

## 2025-06-16 NOTE — PROGRESS NOTES
Clinical Pharmacy Appointment    Patient ID: Nichelle Padilla is a 59 y.o. female who presents for Weight Management.    Pt is here for Follow Up appointment.     Referring Provider: Gerson Can DO  PCP: Gerson Can DO   Last visit with PCP: 4/7/2025   Next visit with PCP: 10/13/2025      Subjective     HPI  WEIGHT MANAGEMENT  PMH significant for hyperlipidemia and prediabetes  Family history of diabetes and cardiovascular disease  Special needs/barriers to therapy: none known at this time    BMI Readings from Last 1 Encounters:   05/19/25 30.34 kg/m²     Lab Results   Component Value Date    HGBA1C 5.8 (H) 04/17/2025    GLUCOSE 97 04/17/2025     Lifestyle  Diet: 3 meals/day with snacks, sweet tooth, room for improvement  Has worked with nutritionist/dietician? Yes, a long time ago  Physical Activity: goal 3-4 class per week cardio/spinning and functional strength classes, currently going 1-2 times per week, hiking with dogs  Alcohol Use: occasional glass of wine, once per month    Non-Pharmacological Therapy  Weight loss techniques attempted:  Self-directed dieting: reduced calorie  Commercial weight loss program: Weight Watchers    Pharmacological Therapy  Current Medications: None for weight loss/prediabetes  Previous Medications: None for weight loss/prediabetes     Pertinent PMH Review:  PMH of Pancreatitis: No  PMH of Gallbladder Disease: No  PMH of Retinopathy: No  PMH of MTC: No     Preventative Care  ASCVD Risk:   The 10-year ASCVD risk score (Katja DK, et al., 2019) is: 2.5%    Values used to calculate the score:      Age: 59 years      Sex: Female      Is Non- : No      Diabetic: No      Tobacco smoker: No      Systolic Blood Pressure: 120 mmHg      Is BP treated: No      HDL Cholesterol: 72 mg/dL      Total Cholesterol: 270 mg/dL  On Statin: No      Medication System Management  Patient's preferred pharmacy: [facility] in [place], [place]  Adherence/Organization: no  "concerns identified  Affordability/Accessibility: unknown cost/coverage of GLP1s  Zepbound prior authorization approved through January 23, 2026  Eligible for copay cards/programs? Yes, automatically applied at  pharmacies      Objective   Allergies[1]  Social History     Social History Narrative    Not on file      Medication Review  Current Outpatient Medications   Medication Instructions    estradiol (Estrace) 0.01 % (0.1 mg/gram) vaginal cream Insert two grams into the vagina twice weekly.    meloxicam (MOBIC) 15 mg, oral, Daily    tirzepatide (weight loss) (ZEPBOUND) 2.5 mg, subcutaneous, Every 7 days      Vitals  BP Readings from Last 2 Encounters:   05/19/25 120/82   04/28/25 138/82     BMI Readings from Last 1 Encounters:   05/19/25 30.34 kg/m²      Labs  A1C  Lab Results   Component Value Date    HGBA1C 5.8 (H) 04/17/2025    HGBA1C 5.7 (H) 10/07/2024     BMP  Lab Results   Component Value Date    CALCIUM 9.2 04/17/2025     04/17/2025    K 4.3 04/17/2025    CO2 29 04/17/2025     04/17/2025    BUN 13 04/17/2025    CREATININE 0.82 04/17/2025    EGFR 83 04/17/2025     LFTs  Lab Results   Component Value Date    ALT 16 04/17/2025    AST 18 04/17/2025    ALKPHOS 80 04/17/2025    BILITOT 1.0 04/17/2025     FLP  Lab Results   Component Value Date    TRIG 168 (H) 04/17/2025    CHOL 270 (H) 04/17/2025    LDLF 149 (H) 07/12/2022    LDLCALC 166 (H) 04/17/2025    HDL 72 04/17/2025     Urine Microalbumin  No results found for: \"MICROALBCREA\"  Weight Management  Wt Readings from Last 3 Encounters:   05/19/25 85.3 kg (188 lb)   04/28/25 84.4 kg (186 lb)   04/07/25 86.3 kg (190 lb 3.2 oz)      There is no height or weight on file to calculate BMI.     Assessment/Plan   Problem List Items Addressed This Visit       Hyperlipemia     Other Visit Diagnoses         Prediabetes        Relevant Medications    tirzepatide, weight loss, (Zepbound) 2.5 mg/0.5 mL injection      BMI 30.0-30.9,adult        Relevant " Medications    tirzepatide, weight loss, (Zepbound) 2.5 mg/0.5 mL injection            DISCUSSION/PLAN:  Previously discussed starting Zepbound for weight loss, blood glucose control, and other benefits. Prior authorization was approved and copay is currently affordable for patient. Detailed education provided at this visit. Will start Zepbound 2.5 mg and follow up in one month or sooner if needed.    START  Zepbound 2.5 mg under the skin once weekly    Future Considerations:  Possible upcoming insurance changes    Monitoring and Education:  Counseled on Zepbound mechanism of action, side effects, and monitoring  Detailed education on Zepbound administration to ensure correct technique  Discussed storage, disposal, missed doses, and titration schedule  Answered all patient questions    Continue all meds under the continuation of care with the referring provider and clinical pharmacy team.    Clinical Pharmacist follow-up: to be scheduled, approximately 4 weeks  Orders placed: Zepbound 2.5 mg to [facility] in [place], [place]    Thank you,   Pooja Lu, PharmD  Clinical Pharmacy Specialist, Primary Care   [phone]    Verbal consent to manage patient's drug therapy was obtained from the patient . Patient was informed she may decline to participate or withdraw from participation in pharmacy services at any time.         [1]   Allergies  Allergen Reactions    Codeine Nausea And Vomiting, Other and Nausea/vomiting

## 2025-06-17 DIAGNOSIS — R73.03 PREDIABETES: Primary | ICD-10-CM

## 2025-06-23 DIAGNOSIS — N95.1 VASOMOTOR SYMPTOMS DUE TO MENOPAUSE: ICD-10-CM

## 2025-06-23 RX ORDER — ESTRADIOL 0.1 MG/G
CREAM VAGINAL
Qty: 127.5 G | Refills: 3 | Status: SHIPPED | OUTPATIENT
Start: 2025-06-23

## 2025-07-01 ENCOUNTER — TELEPHONE (OUTPATIENT)
Dept: PHARMACY | Facility: HOSPITAL | Age: 60
End: 2025-07-01
Payer: COMMERCIAL

## 2025-07-01 DIAGNOSIS — R73.03 PREDIABETES: Primary | ICD-10-CM

## 2025-07-18 ENCOUNTER — APPOINTMENT (OUTPATIENT)
Dept: PHARMACY | Facility: HOSPITAL | Age: 60
End: 2025-07-18
Payer: COMMERCIAL

## 2025-07-18 DIAGNOSIS — R73.03 PREDIABETES: Primary | ICD-10-CM

## 2025-07-18 DIAGNOSIS — E78.5 HYPERLIPIDEMIA, UNSPECIFIED HYPERLIPIDEMIA TYPE: ICD-10-CM

## 2025-07-18 NOTE — PROGRESS NOTES
Clinical Pharmacy Appointment    Patient ID: Nichelle Padilla is a 59 y.o. female who presents for Weight Management.    Pt is here for Follow Up appointment.     Referring Provider: Gerson Can DO  PCP: Gerson Can DO   Last visit with PCP: 4/7/2025   Next visit with PCP: 10/13/2025    INTERVAL HISTORY   Patient's last appointment with clinical pharmacy team on 6/16/2025  Recent hospitalizations? No   Medication changes? No   Missed doses of medications? No   Side effects? Yes   Patient reports some fatigue on Zepbound injection days  Updated relevant labs? No   Changes to diet or exercise regimen? Yes   She has noticeable appetite suppression and is eating smaller portions  She is following a meal plan and has been exercising regularly      Subjective     HPI  WEIGHT MANAGEMENT  PMH significant for hyperlipidemia and prediabetes  Family history of diabetes and cardiovascular disease  Special needs/barriers to therapy: none known at this time    BMI Readings from Last 1 Encounters:   05/19/25 30.34 kg/m²     Lab Results   Component Value Date    HGBA1C 5.8 (H) 04/17/2025    GLUCOSE 97 04/17/2025     Lifestyle  Diet: 3 meals/day with snacks, sweet tooth, room for improvement  Has worked with nutritionist/dietician? Yes, a long time ago  Physical Activity: goal 3-4 class per week cardio/spinning and functional strength classes, currently going 1-2 times per week, hiking with dogs  Alcohol Use: occasional glass of wine, once per month    Weight Loss  Starting weight 184 pounds on home scale 6/16/2025 (prior to GLP1)  Current weight 170 pounds on home scale 7/18/2025 (on Zepbound 2.5 mg)    Non-Pharmacological Therapy  Weight loss techniques attempted:  Self-directed dieting: reduced calorie  Commercial weight loss program: Weight Watchers    Pharmacological Therapy  Current Medications:   Zepbound 2.5 mg weekly    Previous Medications: None for weight loss/prediabetes     Pertinent PMH Review:  PMH of  Pancreatitis: No  PMH of Gallbladder Disease: No  PMH of Retinopathy: No  PMH of MTC: No     Preventative Care  ASCVD Risk:   The 10-year ASCVD risk score (Katja SHABAZZ, et al., 2019) is: 2.5%    Values used to calculate the score:      Age: 59 years      Clincally relevant sex: Female      Is Non- : No      Diabetic: No      Tobacco smoker: No      Systolic Blood Pressure: 120 mmHg      Is BP treated: No      HDL Cholesterol: 72 mg/dL      Total Cholesterol: 270 mg/dL  On Statin: No      Medication System Management  Patient's preferred pharmacy: [facility] in [place], [place]  Adherence/Organization: no concerns identified  Affordability/Accessibility: unknown cost/coverage of GLP1s  Zepbound prior authorization approved through January 23, 2026  Eligible for copay cards/programs? Yes, enrolled      Objective   Allergies[1]  Social History     Social History Narrative    Not on file      Medication Review  Current Outpatient Medications   Medication Instructions    estradiol (Estrace) 0.01 % (0.1 mg/gram) vaginal cream Insert two grams into the vagina twice weekly.    meloxicam (MOBIC) 15 mg, oral, Daily    tirzepatide (weight loss) (ZEPBOUND) 2.5 mg, subcutaneous, Every 7 days    tirzepatide (weight loss) (ZEPBOUND) 5 mg, subcutaneous, Every 7 days      Vitals  BP Readings from Last 2 Encounters:   05/19/25 120/82   04/28/25 138/82     BMI Readings from Last 1 Encounters:   05/19/25 30.34 kg/m²      Labs  A1C  Lab Results   Component Value Date    HGBA1C 5.8 (H) 04/17/2025    HGBA1C 5.7 (H) 10/07/2024     BMP  Lab Results   Component Value Date    CALCIUM 9.2 04/17/2025     04/17/2025    K 4.3 04/17/2025    CO2 29 04/17/2025     04/17/2025    BUN 13 04/17/2025    CREATININE 0.82 04/17/2025    EGFR 83 04/17/2025     LFTs  Lab Results   Component Value Date    ALT 16 04/17/2025    AST 18 04/17/2025    ALKPHOS 80 04/17/2025    BILITOT 1.0 04/17/2025     FLP  Lab Results  "  Component Value Date    TRIG 168 (H) 04/17/2025    CHOL 270 (H) 04/17/2025    LDLF 149 (H) 07/12/2022    LDLCALC 166 (H) 04/17/2025    HDL 72 04/17/2025     Urine Microalbumin  No results found for: \"MICROALBCREA\"  Weight Management  Wt Readings from Last 3 Encounters:   05/19/25 85.3 kg (188 lb)   04/28/25 84.4 kg (186 lb)   04/07/25 86.3 kg (190 lb 3.2 oz)      There is no height or weight on file to calculate BMI.     Assessment/Plan   Problem List Items Addressed This Visit       Hyperlipemia    Relevant Orders    Referral to Clinical Pharmacy     Other Visit Diagnoses         Prediabetes    -  Primary    Relevant Orders    Referral to Clinical Pharmacy      BMI 30.0-30.9,adult        Relevant Orders    Referral to Clinical Pharmacy            DISCUSSION/PLAN:  Patient recently started Zepbound for weight loss, blood glucose control, and other benefits. She endorses some fatigue on injection days, but no other significant side effects at this time. Patient has been following a meal plan and exercising regularly. She has lost about 14 pounds so far. Will continue current Zepbound dose as she has adequate appetite suppression. Follow up in 4 weeks or sooner if needed.    CONTINUE  Zepbound 2.5 mg under the skin once weekly on Saturdays    Future Considerations:  Possible upcoming insurance changes    Monitoring and Education:  Counseled on Zepbound mechanism of action, side effects, and monitoring  Reviewed Zepbound administration and dose titration schedule  Answered all patient questions    Continue all meds under the continuation of care with the referring provider and clinical pharmacy team.    Clinical Pharmacist follow-up: August 15th, 2025 at 11:20 AM  Orders placed: none at this time    Thank you,   Pooja Lu, PharmD  Clinical Pharmacy Specialist, Primary Care   [phone]    Verbal consent to manage patient's drug therapy was obtained from the patient . Patient was informed she may decline to " participate or withdraw from participation in pharmacy services at any time.         [1]   Allergies  Allergen Reactions    Codeine Nausea And Vomiting, Other and Nausea/vomiting

## 2025-07-25 ENCOUNTER — TELEPHONE (OUTPATIENT)
Dept: PRIMARY CARE | Facility: CLINIC | Age: 60
End: 2025-07-25
Payer: COMMERCIAL

## 2025-07-25 DIAGNOSIS — Z12.39 ENCOUNTER FOR SCREENING BREAST EXAMINATION: Primary | ICD-10-CM

## 2025-07-25 NOTE — TELEPHONE ENCOUNTER
Nichelle also would like to know if you can fit Tex in for his sports physical 7/30, so insurance will cover?

## 2025-07-25 NOTE — TELEPHONE ENCOUNTER
Pt called, lvm, she would like to have her Mammogram scheduled for next week. Pt states her insurance ends at the end of this month and would like to have done before it ends to avoid self pay.

## 2025-07-25 NOTE — TELEPHONE ENCOUNTER
You can let patient know it is unlikely insurance would cover a screening mammogram at this point as it is 3 months early

## 2025-07-31 ENCOUNTER — TELEPHONE (OUTPATIENT)
Dept: PHARMACY | Facility: HOSPITAL | Age: 60
End: 2025-07-31
Payer: COMMERCIAL

## 2025-07-31 NOTE — TELEPHONE ENCOUNTER
Clinical Pharmacy Phone Call    Returned call to patient regarding Zepbound. All questions answered. Follow up as planned.     Thank you,   Pooja Lu, PharmD  Clinical Pharmacy Specialist, Primary Care   [phone]

## 2025-08-15 ENCOUNTER — APPOINTMENT (OUTPATIENT)
Dept: PHARMACY | Facility: HOSPITAL | Age: 60
End: 2025-08-15
Payer: COMMERCIAL

## 2025-08-15 DIAGNOSIS — R73.03 PREDIABETES: ICD-10-CM

## 2025-08-15 DIAGNOSIS — E78.5 HYPERLIPIDEMIA, UNSPECIFIED HYPERLIPIDEMIA TYPE: ICD-10-CM

## 2025-09-12 ENCOUNTER — APPOINTMENT (OUTPATIENT)
Dept: PHARMACY | Facility: HOSPITAL | Age: 60
End: 2025-09-12
Payer: COMMERCIAL

## 2025-10-13 ENCOUNTER — APPOINTMENT (OUTPATIENT)
Dept: PRIMARY CARE | Facility: CLINIC | Age: 60
End: 2025-10-13
Payer: COMMERCIAL

## 2025-10-20 ENCOUNTER — APPOINTMENT (OUTPATIENT)
Dept: OBSTETRICS AND GYNECOLOGY | Facility: CLINIC | Age: 60
End: 2025-10-20
Payer: COMMERCIAL